# Patient Record
Sex: MALE | Race: WHITE | NOT HISPANIC OR LATINO | Employment: OTHER | URBAN - METROPOLITAN AREA
[De-identification: names, ages, dates, MRNs, and addresses within clinical notes are randomized per-mention and may not be internally consistent; named-entity substitution may affect disease eponyms.]

---

## 2023-05-20 DIAGNOSIS — I10 ESSENTIAL (PRIMARY) HYPERTENSION: ICD-10-CM

## 2023-05-22 RX ORDER — LOSARTAN POTASSIUM 100 MG/1
TABLET ORAL
Qty: 90 TABLET | Refills: 3 | Status: SHIPPED | OUTPATIENT
Start: 2023-05-22 | End: 2024-04-10 | Stop reason: SDUPTHER

## 2023-05-25 ENCOUNTER — LAB (OUTPATIENT)
Dept: LAB | Facility: LAB | Age: 83
End: 2023-05-25
Payer: MEDICARE

## 2023-05-25 ENCOUNTER — OFFICE VISIT (OUTPATIENT)
Dept: PRIMARY CARE | Facility: CLINIC | Age: 83
End: 2023-05-25
Payer: MEDICARE

## 2023-05-25 VITALS
HEIGHT: 69 IN | RESPIRATION RATE: 18 BRPM | SYSTOLIC BLOOD PRESSURE: 120 MMHG | OXYGEN SATURATION: 98 % | HEART RATE: 66 BPM | DIASTOLIC BLOOD PRESSURE: 70 MMHG | WEIGHT: 170 LBS | BODY MASS INDEX: 25.18 KG/M2

## 2023-05-25 DIAGNOSIS — I10 BENIGN ESSENTIAL HYPERTENSION: ICD-10-CM

## 2023-05-25 DIAGNOSIS — E03.9 HYPOTHYROIDISM, UNSPECIFIED TYPE: ICD-10-CM

## 2023-05-25 DIAGNOSIS — E78.00 HYPERCHOLESTEROLEMIA: ICD-10-CM

## 2023-05-25 DIAGNOSIS — E03.9 HYPOTHYROIDISM, UNSPECIFIED TYPE: Primary | ICD-10-CM

## 2023-05-25 DIAGNOSIS — I25.10 ASCVD (ARTERIOSCLEROTIC CARDIOVASCULAR DISEASE): ICD-10-CM

## 2023-05-25 DIAGNOSIS — I10 PRIMARY HYPERTENSION: ICD-10-CM

## 2023-05-25 PROBLEM — M54.16 CHRONIC LUMBAR RADICULOPATHY: Status: ACTIVE | Noted: 2023-05-25

## 2023-05-25 PROBLEM — M54.50 CHRONIC LOW BACK PAIN: Status: ACTIVE | Noted: 2023-05-25

## 2023-05-25 PROBLEM — I70.0 AORTIC CALCIFICATION (CMS-HCC): Status: ACTIVE | Noted: 2023-05-25

## 2023-05-25 PROBLEM — R04.2 HEMOPTYSIS: Status: ACTIVE | Noted: 2023-05-25

## 2023-05-25 PROBLEM — R21 RASH: Status: ACTIVE | Noted: 2023-05-25

## 2023-05-25 PROBLEM — R07.81 RIB PAIN ON RIGHT SIDE: Status: ACTIVE | Noted: 2023-05-25

## 2023-05-25 PROBLEM — G89.29 CHRONIC LOW BACK PAIN: Status: ACTIVE | Noted: 2023-05-25

## 2023-05-25 PROBLEM — L23.9 ALLERGIC DERMATITIS: Status: ACTIVE | Noted: 2023-05-25

## 2023-05-25 PROBLEM — H90.3 BILATERAL SENSORINEURAL HEARING LOSS: Status: ACTIVE | Noted: 2023-05-25

## 2023-05-25 PROBLEM — M51.26 LUMBAR DISC HERNIATION: Status: ACTIVE | Noted: 2023-05-25

## 2023-05-25 PROBLEM — I73.9 PVD (PERIPHERAL VASCULAR DISEASE) (CMS-HCC): Status: ACTIVE | Noted: 2023-05-25

## 2023-05-25 PROBLEM — M54.16 LUMBAR NEURITIS: Status: ACTIVE | Noted: 2023-05-25

## 2023-05-25 LAB
THYROTROPIN (MIU/L) IN SER/PLAS BY DETECTION LIMIT <= 0.05 MIU/L: 0.27 MIU/L (ref 0.44–3.98)
THYROXINE (T4) FREE (NG/DL) IN SER/PLAS: 1.35 NG/DL (ref 0.78–1.48)

## 2023-05-25 PROCEDURE — 3078F DIAST BP <80 MM HG: CPT | Performed by: INTERNAL MEDICINE

## 2023-05-25 PROCEDURE — 84439 ASSAY OF FREE THYROXINE: CPT

## 2023-05-25 PROCEDURE — 84443 ASSAY THYROID STIM HORMONE: CPT

## 2023-05-25 PROCEDURE — 1036F TOBACCO NON-USER: CPT | Performed by: INTERNAL MEDICINE

## 2023-05-25 PROCEDURE — 36415 COLL VENOUS BLD VENIPUNCTURE: CPT

## 2023-05-25 PROCEDURE — 99204 OFFICE O/P NEW MOD 45 MIN: CPT | Performed by: INTERNAL MEDICINE

## 2023-05-25 PROCEDURE — 3074F SYST BP LT 130 MM HG: CPT | Performed by: INTERNAL MEDICINE

## 2023-05-25 PROCEDURE — 1159F MED LIST DOCD IN RCRD: CPT | Performed by: INTERNAL MEDICINE

## 2023-05-25 RX ORDER — ASPIRIN 81 MG/1
1 TABLET ORAL DAILY
COMMUNITY

## 2023-05-25 RX ORDER — LEVOTHYROXINE SODIUM 112 UG/1
1 TABLET ORAL
COMMUNITY
Start: 2014-02-17 | End: 2023-08-29 | Stop reason: SDUPTHER

## 2023-05-25 RX ORDER — LOSARTAN POTASSIUM AND HYDROCHLOROTHIAZIDE 12.5; 1 MG/1; MG/1
1 TABLET ORAL DAILY
COMMUNITY
End: 2023-05-25 | Stop reason: SINTOL

## 2023-05-25 RX ORDER — AMLODIPINE BESYLATE 2.5 MG/1
2.5 TABLET ORAL DAILY
COMMUNITY
Start: 2023-05-13 | End: 2023-12-20 | Stop reason: WASHOUT

## 2023-05-25 RX ORDER — MULTIVITAMIN
1 TABLET ORAL DAILY
COMMUNITY

## 2023-05-25 RX ORDER — FLUOCINOLONE ACETONIDE 0.25 MG/G
OINTMENT TOPICAL
COMMUNITY
Start: 2022-09-14 | End: 2023-05-25 | Stop reason: ALTCHOICE

## 2023-05-25 RX ORDER — TRIAMCINOLONE ACETONIDE 0.25 MG/G
CREAM TOPICAL
COMMUNITY
Start: 2022-03-30 | End: 2023-05-25 | Stop reason: ALTCHOICE

## 2023-05-25 RX ORDER — NEBULIZER AND COMPRESSOR
EACH MISCELLANEOUS
Qty: 1 EACH | Refills: 0 | Status: SHIPPED | OUTPATIENT
Start: 2023-05-25

## 2023-05-25 RX ORDER — ATORVASTATIN CALCIUM 20 MG/1
20 TABLET, FILM COATED ORAL DAILY
COMMUNITY
End: 2023-08-29 | Stop reason: SDUPTHER

## 2023-05-25 RX ORDER — TAMSULOSIN HYDROCHLORIDE 0.4 MG/1
0.4 CAPSULE ORAL DAILY
COMMUNITY
Start: 2023-02-14 | End: 2023-05-25 | Stop reason: ALTCHOICE

## 2023-05-25 RX ORDER — CHLORTHALIDONE 25 MG/1
25 TABLET ORAL DAILY
COMMUNITY
Start: 2022-06-24 | End: 2023-05-25 | Stop reason: SINTOL

## 2023-05-25 ASSESSMENT — PAIN SCALES - GENERAL: PAINLEVEL: 0-NO PAIN

## 2023-06-05 PROBLEM — M51.36 LUMBAR DEGENERATIVE DISC DISEASE: Status: ACTIVE | Noted: 2023-06-05

## 2023-06-05 PROBLEM — M51.369 LUMBAR DEGENERATIVE DISC DISEASE: Status: ACTIVE | Noted: 2023-06-05

## 2023-06-05 PROBLEM — S39.012A STRAIN OF LUMBAR REGION: Status: ACTIVE | Noted: 2023-06-05

## 2023-06-05 PROBLEM — M16.11 ARTHRITIS OF RIGHT HIP: Status: ACTIVE | Noted: 2023-06-05

## 2023-06-05 PROBLEM — R00.2 PALPITATIONS: Status: ACTIVE | Noted: 2023-04-26

## 2023-06-05 PROBLEM — M16.12 ARTHRITIS OF LEFT HIP: Status: ACTIVE | Noted: 2023-06-05

## 2023-06-05 PROBLEM — M47.816 LUMBAR SPONDYLOSIS: Status: ACTIVE | Noted: 2023-06-05

## 2023-06-05 RX ORDER — MULTIVITAMIN
TABLET ORAL
COMMUNITY
End: 2023-12-20 | Stop reason: ALTCHOICE

## 2023-06-07 ENCOUNTER — OFFICE VISIT (OUTPATIENT)
Dept: PRIMARY CARE | Facility: CLINIC | Age: 83
End: 2023-06-07
Payer: MEDICARE

## 2023-06-07 DIAGNOSIS — E03.9 HYPOTHYROIDISM, UNSPECIFIED TYPE: ICD-10-CM

## 2023-06-07 DIAGNOSIS — I10 BENIGN ESSENTIAL HYPERTENSION: ICD-10-CM

## 2023-06-07 DIAGNOSIS — E78.00 HYPERCHOLESTEROLEMIA: ICD-10-CM

## 2023-06-07 DIAGNOSIS — G47.00 INSOMNIA, UNSPECIFIED TYPE: ICD-10-CM

## 2023-06-07 DIAGNOSIS — I25.10 ASCVD (ARTERIOSCLEROTIC CARDIOVASCULAR DISEASE): Primary | ICD-10-CM

## 2023-06-07 PROCEDURE — 1126F AMNT PAIN NOTED NONE PRSNT: CPT | Performed by: INTERNAL MEDICINE

## 2023-06-07 PROCEDURE — 99213 OFFICE O/P EST LOW 20 MIN: CPT | Performed by: INTERNAL MEDICINE

## 2023-06-07 PROCEDURE — 1036F TOBACCO NON-USER: CPT | Performed by: INTERNAL MEDICINE

## 2023-06-07 PROCEDURE — 1159F MED LIST DOCD IN RCRD: CPT | Performed by: INTERNAL MEDICINE

## 2023-06-07 RX ORDER — MELATONIN 3 MG
1 CAPSULE ORAL NIGHTLY
Qty: 90 CAPSULE | Refills: 1 | Status: CANCELLED | OUTPATIENT
Start: 2023-06-07 | End: 2023-12-04

## 2023-06-27 ENCOUNTER — APPOINTMENT (OUTPATIENT)
Dept: PRIMARY CARE | Facility: CLINIC | Age: 83
End: 2023-06-27
Payer: MEDICARE

## 2023-06-27 NOTE — PROGRESS NOTES
"Subjective   Modesto Velarde is a 82 y.o. male who presents for New Patient Visit.  Patient presents to [facility].  He has no acute complaints today.  Previous lab work reviewed.  Patient is due for thyroid labs.  Patient does not check his blood pressure at home.  He does not have a blood pressure cuff.        Objective     /70 (BP Location: Right arm, Patient Position: Sitting, BP Cuff Size: Adult)   Pulse 66   Resp 18   Ht 1.753 m (5' 9\")   Wt 77.1 kg (170 lb)   SpO2 98%   BMI 25.10 kg/m²      Physical Exam  Constitutional:       Comments: Elderly male   HENT:      Head: Normocephalic and atraumatic.      Nose: Nose normal.      Mouth/Throat:      Mouth: Mucous membranes are moist.      Pharynx: Oropharynx is clear.   Eyes:      Extraocular Movements: Extraocular movements intact.      Pupils: Pupils are equal, round, and reactive to light.   Cardiovascular:      Rate and Rhythm: Normal rate and regular rhythm.   Pulmonary:      Effort: No respiratory distress.      Breath sounds: Normal breath sounds. No wheezing, rhonchi or rales.   Abdominal:      General: Bowel sounds are normal. There is no distension.      Palpations: Abdomen is soft.      Tenderness: There is no abdominal tenderness. There is no guarding.   Musculoskeletal:      Right lower leg: No edema.      Left lower leg: No edema.   Skin:     General: Skin is warm and dry.   Neurological:      Mental Status: He is alert and oriented to person, place, and time. Mental status is at baseline.   Psychiatric:         Mood and Affect: Mood normal.         Behavior: Behavior normal.         Assessment/Plan   Problem List Items Addressed This Visit       ASCVD (arteriosclerotic cardiovascular disease)    Benign essential hypertension    Hypercholesterolemia    Hypothyroidism - Primary    Relevant Orders    Thyroid Stimulating Hormone (Completed)    T4, free (Completed)     Other Visit Diagnoses       Primary hypertension        Relevant " Medications    miscellaneous medical supply (Blood Pressure Cuff) misc          We will obtain thyroid labs  Follow-up in 2 weeks       Maurice Nath DO

## 2023-07-06 NOTE — PROGRESS NOTES
Subjective   Modesto Velarde is a 82 y.o. male who presents for No chief complaint on file..  Patient presents for blood work follow-up.  Labs reviewed with patient.  No acute complaints.        Objective     There were no vitals taken for this visit.     Physical Exam  Constitutional:       Comments: Elderly male   HENT:      Head: Normocephalic and atraumatic.      Nose: Nose normal.      Mouth/Throat:      Mouth: Mucous membranes are moist.      Pharynx: Oropharynx is clear.   Eyes:      Extraocular Movements: Extraocular movements intact.      Pupils: Pupils are equal, round, and reactive to light.   Cardiovascular:      Rate and Rhythm: Normal rate and regular rhythm.   Pulmonary:      Effort: No respiratory distress.      Breath sounds: Normal breath sounds. No wheezing, rhonchi or rales.   Abdominal:      General: Bowel sounds are normal. There is no distension.      Palpations: Abdomen is soft.      Tenderness: There is no abdominal tenderness. There is no guarding.   Musculoskeletal:      Right lower leg: No edema.      Left lower leg: No edema.   Skin:     General: Skin is warm and dry.   Neurological:      Mental Status: He is alert and oriented to person, place, and time. Mental status is at baseline.   Psychiatric:         Mood and Affect: Mood normal.         Behavior: Behavior normal.     Assessment/Plan   Problem List Items Addressed This Visit       ASCVD (arteriosclerotic cardiovascular disease) - Primary    Benign essential hypertension    Hypercholesterolemia    Hypothyroidism     Other Visit Diagnoses       Insomnia, unspecified type              Continue medications as previously prescribed  Follow-up in 3 months for repeat TSH and free thyroxine       Maurice Nath DO

## 2023-08-18 PROBLEM — H52.223 MYOPIA OF BOTH EYES WITH REGULAR ASTIGMATISM: Status: ACTIVE | Noted: 2023-08-18

## 2023-08-18 PROBLEM — H52.13 MYOPIA OF BOTH EYES WITH REGULAR ASTIGMATISM: Status: ACTIVE | Noted: 2023-08-18

## 2023-08-18 PROBLEM — H02.88A MEIBOMIAN GLAND DYSFUNCTION (MGD) OF UPPER AND LOWER LIDS OF BOTH EYES: Status: ACTIVE | Noted: 2023-08-18

## 2023-08-18 PROBLEM — H02.88B MEIBOMIAN GLAND DYSFUNCTION (MGD) OF UPPER AND LOWER LIDS OF BOTH EYES: Status: ACTIVE | Noted: 2023-08-18

## 2023-08-18 PROBLEM — H40.053 OCULAR HYPERTENSION, BILATERAL: Status: ACTIVE | Noted: 2023-08-18

## 2023-08-18 PROBLEM — Z96.1 BILATERAL PSEUDOPHAKIA: Status: ACTIVE | Noted: 2023-08-18

## 2023-08-18 PROBLEM — H52.03 HYPERMETROPIA OF BOTH EYES: Status: ACTIVE | Noted: 2023-08-18

## 2023-08-18 RX ORDER — CETIRIZINE HYDROCHLORIDE 10 MG/1
10 TABLET ORAL DAILY
COMMUNITY
End: 2023-12-20 | Stop reason: WASHOUT

## 2023-08-18 RX ORDER — TAMSULOSIN HYDROCHLORIDE 0.4 MG/1
0.4 CAPSULE ORAL DAILY
COMMUNITY
End: 2023-12-20 | Stop reason: WASHOUT

## 2023-08-29 DIAGNOSIS — E78.00 HYPERCHOLESTEROLEMIA: ICD-10-CM

## 2023-08-29 DIAGNOSIS — E03.9 HYPOTHYROIDISM, UNSPECIFIED TYPE: ICD-10-CM

## 2023-08-29 RX ORDER — LEVOTHYROXINE SODIUM 112 UG/1
112 TABLET ORAL
Qty: 90 TABLET | Refills: 3 | Status: SHIPPED | OUTPATIENT
Start: 2023-08-29 | End: 2023-08-30 | Stop reason: SDUPTHER

## 2023-08-29 RX ORDER — ATORVASTATIN CALCIUM 20 MG/1
20 TABLET, FILM COATED ORAL DAILY
Qty: 90 TABLET | Refills: 3 | Status: SHIPPED | OUTPATIENT
Start: 2023-08-29 | End: 2023-08-30 | Stop reason: SDUPTHER

## 2023-08-30 RX ORDER — LEVOTHYROXINE SODIUM 112 UG/1
112 TABLET ORAL
Qty: 90 TABLET | Refills: 3 | Status: SHIPPED | OUTPATIENT
Start: 2023-08-30

## 2023-08-30 RX ORDER — ATORVASTATIN CALCIUM 20 MG/1
20 TABLET, FILM COATED ORAL DAILY
Qty: 90 TABLET | Refills: 3 | Status: SHIPPED | OUTPATIENT
Start: 2023-08-30

## 2023-09-12 ENCOUNTER — OFFICE VISIT (OUTPATIENT)
Dept: PRIMARY CARE | Facility: CLINIC | Age: 83
End: 2023-09-12
Payer: MEDICARE

## 2023-09-12 VITALS
HEART RATE: 70 BPM | DIASTOLIC BLOOD PRESSURE: 80 MMHG | SYSTOLIC BLOOD PRESSURE: 154 MMHG | BODY MASS INDEX: 25.33 KG/M2 | WEIGHT: 171 LBS | RESPIRATION RATE: 14 BRPM | HEIGHT: 69 IN | OXYGEN SATURATION: 97 %

## 2023-09-12 DIAGNOSIS — I10 BENIGN ESSENTIAL HYPERTENSION: ICD-10-CM

## 2023-09-12 DIAGNOSIS — N40.1 BENIGN PROSTATIC HYPERPLASIA WITH WEAK URINARY STREAM: ICD-10-CM

## 2023-09-12 DIAGNOSIS — E03.9 HYPOTHYROIDISM, UNSPECIFIED TYPE: Primary | ICD-10-CM

## 2023-09-12 DIAGNOSIS — R79.9 ABNORMAL FINDING OF BLOOD CHEMISTRY, UNSPECIFIED: ICD-10-CM

## 2023-09-12 DIAGNOSIS — R39.12 BENIGN PROSTATIC HYPERPLASIA WITH WEAK URINARY STREAM: ICD-10-CM

## 2023-09-12 DIAGNOSIS — K42.9 UMBILICAL HERNIA WITHOUT OBSTRUCTION AND WITHOUT GANGRENE: ICD-10-CM

## 2023-09-12 PROCEDURE — 3077F SYST BP >= 140 MM HG: CPT | Performed by: INTERNAL MEDICINE

## 2023-09-12 PROCEDURE — 99214 OFFICE O/P EST MOD 30 MIN: CPT | Performed by: INTERNAL MEDICINE

## 2023-09-12 PROCEDURE — 1036F TOBACCO NON-USER: CPT | Performed by: INTERNAL MEDICINE

## 2023-09-12 PROCEDURE — 1126F AMNT PAIN NOTED NONE PRSNT: CPT | Performed by: INTERNAL MEDICINE

## 2023-09-12 PROCEDURE — 1159F MED LIST DOCD IN RCRD: CPT | Performed by: INTERNAL MEDICINE

## 2023-09-12 PROCEDURE — 3079F DIAST BP 80-89 MM HG: CPT | Performed by: INTERNAL MEDICINE

## 2023-09-12 RX ORDER — METOPROLOL SUCCINATE 25 MG/1
25 TABLET, EXTENDED RELEASE ORAL DAILY
COMMUNITY

## 2023-09-12 ASSESSMENT — PAIN SCALES - GENERAL: PAINLEVEL: 0-NO PAIN

## 2023-09-13 ENCOUNTER — LAB (OUTPATIENT)
Dept: LAB | Facility: LAB | Age: 83
End: 2023-09-13
Payer: MEDICARE

## 2023-09-13 DIAGNOSIS — I10 BENIGN ESSENTIAL HYPERTENSION: ICD-10-CM

## 2023-09-13 DIAGNOSIS — R39.12 BENIGN PROSTATIC HYPERPLASIA WITH WEAK URINARY STREAM: ICD-10-CM

## 2023-09-13 DIAGNOSIS — R79.9 ABNORMAL FINDING OF BLOOD CHEMISTRY, UNSPECIFIED: ICD-10-CM

## 2023-09-13 DIAGNOSIS — E03.9 HYPOTHYROIDISM, UNSPECIFIED TYPE: ICD-10-CM

## 2023-09-13 DIAGNOSIS — N40.1 BENIGN PROSTATIC HYPERPLASIA WITH WEAK URINARY STREAM: ICD-10-CM

## 2023-09-13 DIAGNOSIS — R10.9 ABDOMINAL PAIN, UNSPECIFIED ABDOMINAL LOCATION: ICD-10-CM

## 2023-09-13 LAB
ALANINE AMINOTRANSFERASE (SGPT) (U/L) IN SER/PLAS: 16 U/L (ref 10–52)
ALBUMIN (G/DL) IN SER/PLAS: 4.1 G/DL (ref 3.4–5)
ALKALINE PHOSPHATASE (U/L) IN SER/PLAS: 54 U/L (ref 33–136)
ANION GAP IN SER/PLAS: 11 MMOL/L (ref 10–20)
ASPARTATE AMINOTRANSFERASE (SGOT) (U/L) IN SER/PLAS: 18 U/L (ref 9–39)
BILIRUBIN TOTAL (MG/DL) IN SER/PLAS: 0.5 MG/DL (ref 0–1.2)
CALCIUM (MG/DL) IN SER/PLAS: 8.9 MG/DL (ref 8.6–10.3)
CARBON DIOXIDE, TOTAL (MMOL/L) IN SER/PLAS: 27 MMOL/L (ref 21–32)
CHLORIDE (MMOL/L) IN SER/PLAS: 109 MMOL/L (ref 98–107)
CHOLESTEROL (MG/DL) IN SER/PLAS: 124 MG/DL (ref 0–199)
CHOLESTEROL IN HDL (MG/DL) IN SER/PLAS: 56.1 MG/DL
CHOLESTEROL/HDL RATIO: 2.2
CREATININE (MG/DL) IN SER/PLAS: 1.04 MG/DL (ref 0.5–1.3)
ERYTHROCYTE DISTRIBUTION WIDTH (RATIO) BY AUTOMATED COUNT: 13.2 % (ref 11.5–14.5)
ERYTHROCYTE MEAN CORPUSCULAR HEMOGLOBIN CONCENTRATION (G/DL) BY AUTOMATED: 32.1 G/DL (ref 32–36)
ERYTHROCYTE MEAN CORPUSCULAR VOLUME (FL) BY AUTOMATED COUNT: 100 FL (ref 80–100)
ERYTHROCYTES (10*6/UL) IN BLOOD BY AUTOMATED COUNT: 4.2 X10E12/L (ref 4.5–5.9)
ESTIMATED AVERAGE GLUCOSE FOR HBA1C: 105 MG/DL
GFR MALE: 71 ML/MIN/1.73M2
GLUCOSE (MG/DL) IN SER/PLAS: 86 MG/DL (ref 74–99)
HEMATOCRIT (%) IN BLOOD BY AUTOMATED COUNT: 42 % (ref 41–52)
HEMOGLOBIN (G/DL) IN BLOOD: 13.5 G/DL (ref 13.5–17.5)
HEMOGLOBIN A1C/HEMOGLOBIN TOTAL IN BLOOD: 5.3 %
LDL: 59 MG/DL (ref 0–99)
LEUKOCYTES (10*3/UL) IN BLOOD BY AUTOMATED COUNT: 6.5 X10E9/L (ref 4.4–11.3)
NRBC (PER 100 WBCS) BY AUTOMATED COUNT: 0 /100 WBC (ref 0–0)
PLATELETS (10*3/UL) IN BLOOD AUTOMATED COUNT: 283 X10E9/L (ref 150–450)
POTASSIUM (MMOL/L) IN SER/PLAS: 4.6 MMOL/L (ref 3.5–5.3)
PROSTATE SPECIFIC AG (NG/ML) IN SER/PLAS: 2.51 NG/ML (ref 0–4)
PROTEIN TOTAL: 6 G/DL (ref 6.4–8.2)
SODIUM (MMOL/L) IN SER/PLAS: 142 MMOL/L (ref 136–145)
THYROTROPIN (MIU/L) IN SER/PLAS BY DETECTION LIMIT <= 0.05 MIU/L: 2.03 MIU/L (ref 0.44–3.98)
THYROXINE (T4) FREE (NG/DL) IN SER/PLAS: 1.24 NG/DL (ref 0.61–1.12)
TRIGLYCERIDE (MG/DL) IN SER/PLAS: 45 MG/DL (ref 0–149)
UREA NITROGEN (MG/DL) IN SER/PLAS: 16 MG/DL (ref 6–23)
VLDL: 9 MG/DL (ref 0–40)

## 2023-09-13 PROCEDURE — 84439 ASSAY OF FREE THYROXINE: CPT

## 2023-09-13 PROCEDURE — 80053 COMPREHEN METABOLIC PANEL: CPT

## 2023-09-13 PROCEDURE — 84443 ASSAY THYROID STIM HORMONE: CPT

## 2023-09-13 PROCEDURE — 36415 COLL VENOUS BLD VENIPUNCTURE: CPT

## 2023-09-13 PROCEDURE — 85027 COMPLETE CBC AUTOMATED: CPT

## 2023-09-13 PROCEDURE — 83036 HEMOGLOBIN GLYCOSYLATED A1C: CPT

## 2023-09-13 PROCEDURE — 84153 ASSAY OF PSA TOTAL: CPT

## 2023-09-13 PROCEDURE — 82652 VIT D 1 25-DIHYDROXY: CPT

## 2023-09-13 PROCEDURE — 80061 LIPID PANEL: CPT

## 2023-09-16 LAB — VITAMIN D 1,25-DIHYDROXY: 37.5 PG/ML (ref 19.9–79.3)

## 2023-09-18 ENCOUNTER — TELEPHONE (OUTPATIENT)
Dept: PRIMARY CARE | Facility: CLINIC | Age: 83
End: 2023-09-18
Payer: MEDICARE

## 2023-09-18 DIAGNOSIS — R10.9 ABDOMINAL PAIN: Primary | ICD-10-CM

## 2023-09-29 NOTE — PROGRESS NOTES
"Subjective   Modesto Velarde is a 83 y.o. male who presents for Follow-up.  Patient presents for concerns of umbilical hernia.  No pain.  No nausea or vomiting.  Patient saw urology who diagnosed him with BPH and is checking PSA.  Repeat blood pressure 140/70        Objective     /80 (BP Location: Left arm, Patient Position: Sitting, BP Cuff Size: Adult)   Pulse 70   Resp 14   Ht 1.753 m (5' 9\")   Wt 77.6 kg (171 lb)   SpO2 97%   BMI 25.25 kg/m²      Physical Exam  Constitutional:       Appearance: Normal appearance.      Comments: Elderly male   HENT:      Head: Normocephalic and atraumatic.      Nose: Nose normal.      Mouth/Throat:      Mouth: Mucous membranes are moist.      Pharynx: Oropharynx is clear.   Eyes:      Extraocular Movements: Extraocular movements intact.      Pupils: Pupils are equal, round, and reactive to light.   Cardiovascular:      Rate and Rhythm: Normal rate and regular rhythm.   Pulmonary:      Effort: No respiratory distress.      Breath sounds: Normal breath sounds. No wheezing, rhonchi or rales.   Abdominal:      General: Bowel sounds are normal. There is no distension.      Palpations: Abdomen is soft.      Tenderness: There is no abdominal tenderness. There is no guarding.      Comments: Umbilical hernia, nontender   Musculoskeletal:      Right lower leg: No edema.      Left lower leg: No edema.   Skin:     General: Skin is warm and dry.   Neurological:      Mental Status: He is alert and oriented to person, place, and time. Mental status is at baseline.   Psychiatric:         Mood and Affect: Mood normal.         Behavior: Behavior normal.         Assessment/Plan   Problem List Items Addressed This Visit       Benign essential hypertension    Relevant Orders    CBC (Completed)    Comprehensive Metabolic Panel (Completed)    Hemoglobin A1C (Completed)    Lipid Panel (Completed)    Vitamin D 1,25 Dihydroxy (for eval of hypercalcemia) (Completed)    Hypothyroidism - " Primary    Relevant Orders    Thyroxine, Free (Completed)    Thyroid Stimulating Hormone (Completed)     Other Visit Diagnoses       Umbilical hernia without obstruction and without gangrene        Relevant Orders    Abdominal Ultrasound    Referral to General Surgery    Benign prostatic hyperplasia with weak urinary stream        Relevant Orders    Referral to Urology    PSA (Completed)    Abnormal finding of blood chemistry, unspecified        Relevant Orders    Hemoglobin A1C (Completed)          We will check an ultrasound and refer to general surgery for umbilical hernia  Maintain follow-up with urology, follow-up PSA  Continue medications as previously prescribed  Return in 3 months       Maurice Nath DO

## 2023-10-25 ENCOUNTER — APPOINTMENT (OUTPATIENT)
Dept: PAIN MEDICINE | Facility: CLINIC | Age: 83
End: 2023-10-25
Payer: MEDICARE

## 2023-12-20 ENCOUNTER — OFFICE VISIT (OUTPATIENT)
Dept: PRIMARY CARE | Facility: CLINIC | Age: 83
End: 2023-12-20
Payer: MEDICARE

## 2023-12-20 VITALS
HEIGHT: 69 IN | BODY MASS INDEX: 25.48 KG/M2 | RESPIRATION RATE: 16 BRPM | OXYGEN SATURATION: 99 % | SYSTOLIC BLOOD PRESSURE: 132 MMHG | DIASTOLIC BLOOD PRESSURE: 74 MMHG | WEIGHT: 172 LBS | HEART RATE: 60 BPM

## 2023-12-20 DIAGNOSIS — R39.11 BENIGN PROSTATIC HYPERPLASIA WITH URINARY HESITANCY: Primary | ICD-10-CM

## 2023-12-20 DIAGNOSIS — N40.1 BENIGN PROSTATIC HYPERPLASIA WITH URINARY HESITANCY: Primary | ICD-10-CM

## 2023-12-20 DIAGNOSIS — E03.9 HYPOTHYROIDISM, UNSPECIFIED TYPE: ICD-10-CM

## 2023-12-20 PROBLEM — H02.88A MEIBOMIAN GLAND DYSFUNCTION (MGD) OF UPPER AND LOWER EYELID OF RIGHT EYE: Status: ACTIVE | Noted: 2023-12-20

## 2023-12-20 PROBLEM — R39.9 LOWER URINARY TRACT SYMPTOMS (LUTS): Status: ACTIVE | Noted: 2023-12-20

## 2023-12-20 PROBLEM — K42.9 UMBILICAL HERNIA: Status: ACTIVE | Noted: 2023-12-20

## 2023-12-20 PROBLEM — H02.88B MEIBOMIAN GLAND DYSFUNCTION (MGD) OF UPPER AND LOWER EYELID OF LEFT EYE: Status: ACTIVE | Noted: 2023-12-20

## 2023-12-20 PROCEDURE — 1036F TOBACCO NON-USER: CPT | Performed by: INTERNAL MEDICINE

## 2023-12-20 PROCEDURE — 99214 OFFICE O/P EST MOD 30 MIN: CPT | Performed by: INTERNAL MEDICINE

## 2023-12-20 PROCEDURE — 1159F MED LIST DOCD IN RCRD: CPT | Performed by: INTERNAL MEDICINE

## 2023-12-20 PROCEDURE — 3075F SYST BP GE 130 - 139MM HG: CPT | Performed by: INTERNAL MEDICINE

## 2023-12-20 PROCEDURE — 3078F DIAST BP <80 MM HG: CPT | Performed by: INTERNAL MEDICINE

## 2023-12-20 PROCEDURE — 1126F AMNT PAIN NOTED NONE PRSNT: CPT | Performed by: INTERNAL MEDICINE

## 2023-12-20 ASSESSMENT — PAIN SCALES - GENERAL: PAINLEVEL: 0-NO PAIN

## 2023-12-28 ENCOUNTER — APPOINTMENT (OUTPATIENT)
Dept: RADIOLOGY | Facility: HOSPITAL | Age: 83
End: 2023-12-28
Payer: MEDICARE

## 2023-12-28 ENCOUNTER — HOSPITAL ENCOUNTER (EMERGENCY)
Facility: HOSPITAL | Age: 83
Discharge: HOME | End: 2023-12-28
Payer: MEDICARE

## 2023-12-28 VITALS
TEMPERATURE: 97.7 F | OXYGEN SATURATION: 97 % | WEIGHT: 171.96 LBS | SYSTOLIC BLOOD PRESSURE: 157 MMHG | HEART RATE: 55 BPM | DIASTOLIC BLOOD PRESSURE: 70 MMHG | RESPIRATION RATE: 20 BRPM | BODY MASS INDEX: 25.39 KG/M2

## 2023-12-28 DIAGNOSIS — M79.642 HAND PAIN, LEFT: Primary | ICD-10-CM

## 2023-12-28 PROCEDURE — 73130 X-RAY EXAM OF HAND: CPT | Mod: LT

## 2023-12-28 PROCEDURE — 99283 EMERGENCY DEPT VISIT LOW MDM: CPT

## 2023-12-28 PROCEDURE — 73130 X-RAY EXAM OF HAND: CPT | Mod: LEFT SIDE | Performed by: RADIOLOGY

## 2023-12-28 ASSESSMENT — COLUMBIA-SUICIDE SEVERITY RATING SCALE - C-SSRS
1. IN THE PAST MONTH, HAVE YOU WISHED YOU WERE DEAD OR WISHED YOU COULD GO TO SLEEP AND NOT WAKE UP?: NO
2. HAVE YOU ACTUALLY HAD ANY THOUGHTS OF KILLING YOURSELF?: NO
6. HAVE YOU EVER DONE ANYTHING, STARTED TO DO ANYTHING, OR PREPARED TO DO ANYTHING TO END YOUR LIFE?: NO

## 2023-12-28 NOTE — ED PROVIDER NOTES
HPI   Chief Complaint   Patient presents with    Hand Pain     Patient states left hand pain, hit hand on table 2wks ago and still painful       Patient is a healthy nontoxic-appearing 83-year-old male with past medical history of allergic dermatitis, aortic calcification, arteriosclerotic cardiovascular disease, hypertension, hemoptysis, hypercholesterolemia, PVD, palpitations, presents emergency today for complaint of left hand pain.  Patient states about 2 weeks ago he accidentally struck his hand against a corner of a table.  Patient states he had some pain at the time however resolved.  Patient states however at passable days pain is returned to the top of the left hand.  Patient states he has pain more to the base of the pinky finger into the wrist.  Patient denies any wrist pain or injury.  Patient complains of worsening pain with range of motion and movement.  Patient is right-hand dominant.  Patient denies any numbness or tingling.  Patient denies any weakness in the hands or arm.  Patient denies any chest pain, shortness of breath difficulty breathing, abdominal pain, nausea or vomiting, fever, shaking, or chills.                          Vandiver Coma Scale Score: 15                  Patient History   Past Medical History:   Diagnosis Date    Abrasion of unspecified ear, initial encounter     Abrasion of ear canal    Atherosclerosis of aorta (CMS/HCC) 08/03/2022    Aortic calcification    Atherosclerotic heart disease of native coronary artery without angina pectoris 08/03/2022    ASCVD (arteriosclerotic cardiovascular disease)    Benign prostatic hyperplasia without lower urinary tract symptoms     BPH (benign prostatic hyperplasia)    Encounter for screening for malignant neoplasm of colon     Screening for colorectal cancer    Encounter for screening for malignant neoplasm of prostate     Prostate cancer screening    Hemoptysis 02/22/2022    Hemoptysis    Other chronic pancreatitis (CMS/HCC)     Other  chronic pancreatitis    Peripheral vascular disease, unspecified (CMS/HCC) 08/03/2022    PVD (peripheral vascular disease)    Personal history of diseases of the skin and subcutaneous tissue     History of dermatitis    Personal history of other diseases of the circulatory system     History of coronary atherosclerosis    Personal history of other diseases of the circulatory system 11/18/2014    History of hypertension    Personal history of other diseases of the nervous system and sense organs 11/08/2016    History of hearing loss    Personal history of other diseases of the respiratory system     History of upper respiratory infection    Personal history of other endocrine, nutritional and metabolic disease     History of hypothyroidism    Personal history of other endocrine, nutritional and metabolic disease 11/18/2014    History of hypercholesterolemia    Personal history of other infectious and parasitic diseases     History of shingles    Personal history of other specified conditions     History of abdominal pain    Pleurodynia 03/30/2022    Rib pain on right side    Radiculopathy, lumbar region 04/28/2022    Chronic lumbar radiculopathy    Rash and other nonspecific skin eruption 03/30/2022    Rash    Strain of muscle, fascia and tendon of lower back, initial encounter     Lumbosacral strain    Strain of other muscle(s) and tendon(s) of posterior muscle group at lower leg level, unspecified leg, initial encounter     Gastrocnemius muscle strain     Past Surgical History:   Procedure Laterality Date    CATARACT EXTRACTION  11/18/2014    Cataract Surgery    HERNIA REPAIR  11/18/2014    Hernia Repair    OTHER SURGICAL HISTORY  09/21/2021    Excision of squamous cell carcinoma     Family History   Problem Relation Name Age of Onset    Other (cardiac disorder) Mother      Other (cardiac disorder) Father      Stroke Other       Social History     Tobacco Use    Smoking status: Never    Smokeless tobacco: Never    Substance Use Topics    Alcohol use: Never    Drug use: Never       Physical Exam   ED Triage Vitals [12/28/23 0950]   Temp Heart Rate Resp BP   36.5 °C (97.7 °F) 59 18 (!) 186/77      SpO2 Temp src Heart Rate Source Patient Position   97 % -- Monitor Sitting      BP Location FiO2 (%)     Right arm --       Physical Exam  Vitals and nursing note reviewed.   Constitutional:       General: He is not in acute distress.     Appearance: Normal appearance. He is not ill-appearing, toxic-appearing or diaphoretic.   HENT:      Head: Normocephalic.   Cardiovascular:      Rate and Rhythm: Normal rate and regular rhythm.      Pulses: Normal pulses.      Heart sounds: Normal heart sounds. No murmur heard.     No friction rub. No gallop.   Pulmonary:      Effort: Pulmonary effort is normal. No respiratory distress.      Breath sounds: Normal breath sounds. No stridor. No wheezing, rhonchi or rales.   Chest:      Chest wall: No tenderness.   Musculoskeletal:         General: Tenderness and signs of injury present. No swelling or deformity. Normal range of motion.      Cervical back: Normal range of motion and neck supple.      Right lower leg: No edema.      Left lower leg: No edema.      Comments: Mild reproducible tenderness upon palpation to the dorsal surface of the left lateral hand with no ecchymosis, erythema or gross deformity present   Skin:     General: Skin is warm.      Capillary Refill: Capillary refill takes less than 2 seconds.      Coloration: Skin is not jaundiced or pale.      Findings: No bruising, erythema, lesion or rash.   Neurological:      General: No focal deficit present.      Mental Status: He is alert and oriented to person, place, and time.         ED Course & MDM   Diagnoses as of 12/28/23 1926   Hand pain, left       Medical Decision Making  Given patient's pain presentation a thorough exam was performed.  Patient does have mild reproducible tenderness upon palpation of the dorsal surface of the  left lateral hand with no ecchymosis, erythema or gross deformity present, hand grasp strong regular bilaterally, cap refills less than 3 seconds, able to flex extend all digits without any difficulty, radial pulses strong and regular, no anatomical snuffbox tenderness upon palpation, I have a low suspicion for flexor extensor tendon injury, vascular compromise.  X-ray was ordered of the left hand.  X-ray interpreted by radiologist reveals no acute osseous abnormality, fracture or dislocation.  Patient received Velcro wrist splint in the emergency room and encouraged use of Tylenol for discomfort.  I encouraged monitoring symptoms, if they become worse return to the emergency room for further evaluation, otherwise follow-up with primary care provider as needed.  Patient was agreeable with this plan and discharged home in stable condition.      MAICOL Germain-CNP     Portions of this note were generated using digital voice recognition software, and may contain grammatical errors     MAICOL Germain-CNP  12/28/23 1927

## 2023-12-29 NOTE — PROGRESS NOTES
"Subjective   Modesto Velarde is a 83 y.o. male who presents for Follow-up (Bloodwork and testing ).  Patient presents for blood work follow-up.  Lab work reviewed with patient.  He is taking his Synthroid 6 days a week.  Thyroid labs acceptable.  Patient saw Dr. Arnold who started metoprolol.  No acute issues or complaints.        Objective     /74 (BP Location: Right arm, Patient Position: Sitting, BP Cuff Size: Adult)   Pulse 60   Resp 16   Ht 1.753 m (5' 9\")   Wt 78 kg (172 lb)   SpO2 99%   BMI 25.40 kg/m²      Physical Exam  Constitutional:       Appearance: Normal appearance.      Comments: Elderly male   HENT:      Head: Normocephalic and atraumatic.      Nose: Nose normal.      Mouth/Throat:      Mouth: Mucous membranes are moist.      Pharynx: Oropharynx is clear.   Eyes:      Extraocular Movements: Extraocular movements intact.      Pupils: Pupils are equal, round, and reactive to light.   Cardiovascular:      Rate and Rhythm: Normal rate and regular rhythm.   Pulmonary:      Effort: No respiratory distress.      Breath sounds: Normal breath sounds. No wheezing, rhonchi or rales.   Abdominal:      General: Bowel sounds are normal. There is no distension.      Palpations: Abdomen is soft.      Tenderness: There is no abdominal tenderness. There is no guarding.      Comments: Umbilical hernia, nontender   Musculoskeletal:      Right lower leg: No edema.      Left lower leg: No edema.   Skin:     General: Skin is warm and dry.   Neurological:      Mental Status: He is alert and oriented to person, place, and time. Mental status is at baseline.   Psychiatric:         Mood and Affect: Mood normal.         Behavior: Behavior normal.     Assessment/Plan   Problem List Items Addressed This Visit       Hypothyroidism    Relevant Orders    Thyroxine, Free    Thyroid Stimulating Hormone     Other Visit Diagnoses       Benign prostatic hyperplasia with urinary hesitancy    -  Primary    Relevant Orders    " Referral to Urology          Medications as previously prescribed  Return in 4 months for repeat TSH and free thyroxine       Maurice Nath DO

## 2024-02-13 NOTE — PROGRESS NOTES
"Subjective     Modesto Velarde is a 83 y.o. male with BPH and LUTS who presents as a new patient kindly referred by his PCP, Dr. Nath.     His PVR today is 200cc. He was previously on Flomax but is no longer on this medication. He did not see improvements on this medication. He previously saw [facility] urology in 2018.     He is s/p \"rotor rooter\" at [facility] in 2018.     His most bothersome urinary symptom is urinary urgency and urinary frequency. In the mornings, he urinates q 30 minutes with moderate volumes. His PSA is normal at 2.51. He states he drinks 2 cups of coffee daily and has difficulty cutting back on this. He does not drink alcohol.     PMHx: CVD, HTN, LUTS,     PSHx:  Cataract extraction (11/18/2014); Hernia repair (11/18/2014); HoLEP    Social: Never smoker    Family: Sisters- breast cancer            Review of Systems   All other systems reviewed and are negative.      Objective   Physical Exam  Gen: No acute distress     Psych: Alert and oriented x3     Neuro:  Normal ROM    Resp: Nonlabored respirations     CV: Regular rate and rhythm     Abd: S, NT, ND.   : Deferred    Skin: Warm, dry and intact without rashes     Lymphatics: No peripheral edema       Assessment/Plan   Problem List Items Addressed This Visit             ICD-10-CM    Lower urinary tract symptoms (LUTS) - Primary R39.9     We had a discussion regarding contributing factors to the patient's lower urinary tract symptoms. I educated the patient on dietary and behavioral modifications pertinent to the patient's complaints. I recommended to avoid caffeine, carbonation, citrus, alcohol, spicy and acidic oral intake and to regulate fluid intake and voiding (timed voiding, double voiding). I stressed the importance of avoiding constipation and recommended stool softeners unless diarrhea present. We discussed limiting fluid intake at night and elevating legs prior to bedtime.     He states his urinary symptoms are bothersome and affect his " quality of life. We discussed conducting a sleep study with his PCP to investigate his nocturia related to possible SUJATHA.     We discussed bladder relaxant medications which has risk of causing confusion in patient's who are older. There is also risk of urinary retention which is a risk given he already retains urine. I would recommend conservative measures prior to prescribing medication. If his symptoms do not improve after behavior modifications, I would refer him to one of my partners who specializes in men's urinary issues as they age.           Other Visit Diagnoses         Codes    Benign prostatic hyperplasia with urinary hesitancy     N40.1, R39.11    Relevant Orders    Post-Void Residual (Completed)          Thank you for the kind referral and allowing me to take part in the care of this patient.        Plan:  Discontinue PSA monitoring given his age and value  List of bladder irritants, try to limit  Recommend sleep study to be done with PCP for nocturia 2-3x may be related to SUJATHA  I think his biggest issue is urgency and frequency.  I offered a referral to one of my colleagues who specialized in LUTS.  The medication class he may benefit most from is a bladder relaxant for his q30 min freq with urgency.   However in 80 years olds that can cause some issues with cognitive functioning.  Also, he retains over 200 ml and can cause issues with retention of urine.  The beta agonists may help rather than an anticholinergic but those medications are quite costly.  He refused referral to anywhere outside of [place] to partners of mine who may be able to offer him more therapies than I currently can.  FUV with me PRN    Will give info on urologists that I would refer him to if interested.            Dorise Attestation  By signing my name below, I, Katelyn Casalla, Scribe   attest that this documentation has been prepared under the direction and in the presence of Morris Calderon MD MPH.

## 2024-02-16 ENCOUNTER — OFFICE VISIT (OUTPATIENT)
Dept: UROLOGY | Facility: CLINIC | Age: 84
End: 2024-02-16
Payer: MEDICARE

## 2024-02-16 VITALS
SYSTOLIC BLOOD PRESSURE: 202 MMHG | DIASTOLIC BLOOD PRESSURE: 81 MMHG | HEIGHT: 69 IN | HEART RATE: 55 BPM | BODY MASS INDEX: 25.95 KG/M2 | WEIGHT: 175.2 LBS

## 2024-02-16 DIAGNOSIS — R39.11 BENIGN PROSTATIC HYPERPLASIA WITH URINARY HESITANCY: ICD-10-CM

## 2024-02-16 DIAGNOSIS — R39.9 LOWER URINARY TRACT SYMPTOMS (LUTS): Primary | ICD-10-CM

## 2024-02-16 DIAGNOSIS — N40.1 BENIGN PROSTATIC HYPERPLASIA WITH URINARY HESITANCY: ICD-10-CM

## 2024-02-16 PROCEDURE — 3077F SYST BP >= 140 MM HG: CPT | Performed by: STUDENT IN AN ORGANIZED HEALTH CARE EDUCATION/TRAINING PROGRAM

## 2024-02-16 PROCEDURE — 3079F DIAST BP 80-89 MM HG: CPT | Performed by: STUDENT IN AN ORGANIZED HEALTH CARE EDUCATION/TRAINING PROGRAM

## 2024-02-16 PROCEDURE — 99204 OFFICE O/P NEW MOD 45 MIN: CPT | Performed by: STUDENT IN AN ORGANIZED HEALTH CARE EDUCATION/TRAINING PROGRAM

## 2024-02-16 PROCEDURE — 1159F MED LIST DOCD IN RCRD: CPT | Performed by: STUDENT IN AN ORGANIZED HEALTH CARE EDUCATION/TRAINING PROGRAM

## 2024-02-16 PROCEDURE — 1126F AMNT PAIN NOTED NONE PRSNT: CPT | Performed by: STUDENT IN AN ORGANIZED HEALTH CARE EDUCATION/TRAINING PROGRAM

## 2024-02-16 PROCEDURE — 1036F TOBACCO NON-USER: CPT | Performed by: STUDENT IN AN ORGANIZED HEALTH CARE EDUCATION/TRAINING PROGRAM

## 2024-02-16 NOTE — LETTER
"February 16, 2024     Maurice Nath DO  [address]  [facility] [place], [place] [address]   [id number]    Patient: Modesto Velarde   YOB: 1940   Date of Visit: 2/16/2024       Dear Dr. Maurice Nath, :    Thank you for referring Modesto Velarde to me for evaluation. Below are my notes for this consultation.  If you have questions, please do not hesitate to call me. I look forward to following your patient along with you.       Sincerely,     Morris Calderon MD MPH      CC: No Recipients  ______________________________________________________________________________________    Subjective     Modesto Velarde is a 83 y.o. male with BPH and LUTS who presents as a new patient kindly referred by his PCP, Dr. Nath.     His PVR today is 200cc. He was previously on Flomax but is no longer on this medication. He did not see improvements on this medication. He previously saw [facility] urology in 2018.     He is s/p \"rotor rooter\" at [facility] in 2018.     His most bothersome urinary symptom is urinary urgency and urinary frequency. In the mornings, he urinates q 30 minutes with moderate volumes. His PSA is normal at 2.51. He states he drinks 2 cups of coffee daily and has difficulty cutting back on this. He does not drink alcohol.     PMHx: CVD, HTN, LUTS,     PSHx:  Cataract extraction (11/18/2014); Hernia repair (11/18/2014); HoLEP    Social: Never smoker    Family: Sisters- breast cancer            Review of Systems   All other systems reviewed and are negative.      Objective   Physical Exam  Gen: No acute distress     Psych: Alert and oriented x3     Neuro:  Normal ROM    Resp: Nonlabored respirations     CV: Regular rate and rhythm     Abd: S, NT, ND.   : Deferred    Skin: Warm, dry and intact without rashes     Lymphatics: No peripheral edema       Assessment/Plan   Problem List Items Addressed This Visit             ICD-10-CM    Lower urinary tract symptoms (LUTS) - Primary R39.9     " We had a discussion regarding contributing factors to the patient's lower urinary tract symptoms. I educated the patient on dietary and behavioral modifications pertinent to the patient's complaints. I recommended to avoid caffeine, carbonation, citrus, alcohol, spicy and acidic oral intake and to regulate fluid intake and voiding (timed voiding, double voiding). I stressed the importance of avoiding constipation and recommended stool softeners unless diarrhea present. We discussed limiting fluid intake at night and elevating legs prior to bedtime.     He states his urinary symptoms are bothersome and affect his quality of life. We discussed conducting a sleep study with his PCP to investigate his nocturia related to possible SUJATHA.     We discussed bladder relaxant medications which has risk of causing confusion in patient's who are older. There is also risk of urinary retention which is a risk given he already retains urine. I would recommend conservative measures prior to prescribing medication. If his symptoms do not improve after behavior modifications, I would refer him to one of my partners who specializes in men's urinary issues as they age.           Other Visit Diagnoses         Codes    Benign prostatic hyperplasia with urinary hesitancy     N40.1, R39.11    Relevant Orders    Post-Void Residual (Completed)          Thank you for the kind referral and allowing me to take part in the care of this patient.        Plan:  Discontinue PSA monitoring given his age and value  List of bladder irritants, try to limit  Recommend sleep study to be done with PCP for nocturia 2-3x may be related to SUJATHA  I think his biggest issue is urgency and frequency.  I offered a referral to one of my colleagues who specialized in LUTS.  The medication class he may benefit most from is a bladder relaxant for his q30 min freq with urgency.   However in 80 years olds that can cause some issues with cognitive functioning.  Also, he  retains over 200 ml and can cause issues with retention of urine.  The beta agonists may help rather than an anticholinergic but those medications are quite costly.  He refused referral to anywhere outside of [place] to partners of mine who may be able to offer him more therapies than I currently can.  FUV with me PRN    Will give info on urologists that I would refer him to if interested.            Kendrick Attestation  By signing my name below, I, Katelyn Casalla, Scribe   attest that this documentation has been prepared under the direction and in the presence of Morris Calderon MD MPH.

## 2024-02-16 NOTE — ASSESSMENT & PLAN NOTE
We had a discussion regarding contributing factors to the patient's lower urinary tract symptoms. I educated the patient on dietary and behavioral modifications pertinent to the patient's complaints. I recommended to avoid caffeine, carbonation, citrus, alcohol, spicy and acidic oral intake and to regulate fluid intake and voiding (timed voiding, double voiding). I stressed the importance of avoiding constipation and recommended stool softeners unless diarrhea present. We discussed limiting fluid intake at night and elevating legs prior to bedtime.     He states his urinary symptoms are bothersome and affect his quality of life. We discussed conducting a sleep study with his PCP to investigate his nocturia related to possible SUJATHA.     We discussed bladder relaxant medications which has risk of causing confusion in patient's who are older. There is also risk of urinary retention which is a risk given he already retains urine. I would recommend conservative measures prior to prescribing medication. If his symptoms do not improve after behavior modifications, I would refer him to one of my partners who specializes in men's urinary issues as they age.

## 2024-04-02 ENCOUNTER — LAB (OUTPATIENT)
Dept: LAB | Facility: LAB | Age: 84
End: 2024-04-02
Payer: MEDICARE

## 2024-04-02 DIAGNOSIS — E03.9 HYPOTHYROIDISM, UNSPECIFIED TYPE: ICD-10-CM

## 2024-04-02 LAB
T4 FREE SERPL-MCNC: 0.83 NG/DL (ref 0.61–1.12)
TSH SERPL-ACNC: 12.26 MIU/L (ref 0.44–3.98)

## 2024-04-02 PROCEDURE — 36415 COLL VENOUS BLD VENIPUNCTURE: CPT

## 2024-04-02 PROCEDURE — 84443 ASSAY THYROID STIM HORMONE: CPT

## 2024-04-02 PROCEDURE — 84439 ASSAY OF FREE THYROXINE: CPT

## 2024-04-07 ENCOUNTER — APPOINTMENT (OUTPATIENT)
Dept: RADIOLOGY | Facility: HOSPITAL | Age: 84
End: 2024-04-07
Payer: MEDICARE

## 2024-04-07 ENCOUNTER — HOSPITAL ENCOUNTER (EMERGENCY)
Facility: HOSPITAL | Age: 84
Discharge: HOME | End: 2024-04-07
Attending: STUDENT IN AN ORGANIZED HEALTH CARE EDUCATION/TRAINING PROGRAM
Payer: MEDICARE

## 2024-04-07 ENCOUNTER — APPOINTMENT (OUTPATIENT)
Dept: CARDIOLOGY | Facility: HOSPITAL | Age: 84
End: 2024-04-07
Payer: MEDICARE

## 2024-04-07 VITALS
OXYGEN SATURATION: 100 % | WEIGHT: 170 LBS | HEART RATE: 56 BPM | HEIGHT: 69 IN | SYSTOLIC BLOOD PRESSURE: 166 MMHG | TEMPERATURE: 97.7 F | BODY MASS INDEX: 25.18 KG/M2 | DIASTOLIC BLOOD PRESSURE: 78 MMHG | RESPIRATION RATE: 20 BRPM

## 2024-04-07 DIAGNOSIS — I15.9 SECONDARY HYPERTENSION: ICD-10-CM

## 2024-04-07 DIAGNOSIS — R42 DIZZINESS: Primary | ICD-10-CM

## 2024-04-07 LAB
ALBUMIN SERPL BCP-MCNC: 4 G/DL (ref 3.4–5)
ALP SERPL-CCNC: 49 U/L (ref 33–136)
ALT SERPL W P-5'-P-CCNC: 19 U/L (ref 10–52)
ANION GAP SERPL CALC-SCNC: 10 MMOL/L (ref 10–20)
AST SERPL W P-5'-P-CCNC: 29 U/L (ref 9–39)
BASOPHILS # BLD AUTO: 0.07 X10*3/UL (ref 0–0.1)
BASOPHILS NFR BLD AUTO: 1 %
BILIRUB SERPL-MCNC: 0.4 MG/DL (ref 0–1.2)
BNP SERPL-MCNC: 95 PG/ML (ref 0–99)
BUN SERPL-MCNC: 11 MG/DL (ref 6–23)
CALCIUM SERPL-MCNC: 8.6 MG/DL (ref 8.6–10.3)
CARDIAC TROPONIN I PNL SERPL HS: 5 NG/L (ref 0–20)
CHLORIDE SERPL-SCNC: 108 MMOL/L (ref 98–107)
CO2 SERPL-SCNC: 28 MMOL/L (ref 21–32)
CREAT SERPL-MCNC: 0.76 MG/DL (ref 0.5–1.3)
EGFRCR SERPLBLD CKD-EPI 2021: 89 ML/MIN/1.73M*2
EOSINOPHIL # BLD AUTO: 0.11 X10*3/UL (ref 0–0.4)
EOSINOPHIL NFR BLD AUTO: 1.6 %
ERYTHROCYTE [DISTWIDTH] IN BLOOD BY AUTOMATED COUNT: 13.2 % (ref 11.5–14.5)
GLUCOSE SERPL-MCNC: 91 MG/DL (ref 74–99)
HCT VFR BLD AUTO: 42.9 % (ref 41–52)
HGB BLD-MCNC: 14.3 G/DL (ref 13.5–17.5)
IMM GRANULOCYTES # BLD AUTO: 0.02 X10*3/UL (ref 0–0.5)
IMM GRANULOCYTES NFR BLD AUTO: 0.3 % (ref 0–0.9)
LYMPHOCYTES # BLD AUTO: 2.41 X10*3/UL (ref 0.8–3)
LYMPHOCYTES NFR BLD AUTO: 35.2 %
MCH RBC QN AUTO: 33.2 PG (ref 26–34)
MCHC RBC AUTO-ENTMCNC: 33.3 G/DL (ref 32–36)
MCV RBC AUTO: 100 FL (ref 80–100)
MONOCYTES # BLD AUTO: 0.45 X10*3/UL (ref 0.05–0.8)
MONOCYTES NFR BLD AUTO: 6.6 %
NEUTROPHILS # BLD AUTO: 3.78 X10*3/UL (ref 1.6–5.5)
NEUTROPHILS NFR BLD AUTO: 55.3 %
NRBC BLD-RTO: 0 /100 WBCS (ref 0–0)
PLATELET # BLD AUTO: 263 X10*3/UL (ref 150–450)
POTASSIUM SERPL-SCNC: 5.3 MMOL/L (ref 3.5–5.3)
POTASSIUM SERPL-SCNC: 5.9 MMOL/L (ref 3.5–5.3)
PROT SERPL-MCNC: 5.9 G/DL (ref 6.4–8.2)
RBC # BLD AUTO: 4.31 X10*6/UL (ref 4.5–5.9)
SODIUM SERPL-SCNC: 140 MMOL/L (ref 136–145)
WBC # BLD AUTO: 6.8 X10*3/UL (ref 4.4–11.3)

## 2024-04-07 PROCEDURE — 84484 ASSAY OF TROPONIN QUANT: CPT | Performed by: NURSE PRACTITIONER

## 2024-04-07 PROCEDURE — 71045 X-RAY EXAM CHEST 1 VIEW: CPT

## 2024-04-07 PROCEDURE — 36415 COLL VENOUS BLD VENIPUNCTURE: CPT | Performed by: NURSE PRACTITIONER

## 2024-04-07 PROCEDURE — 99285 EMERGENCY DEPT VISIT HI MDM: CPT | Mod: 25

## 2024-04-07 PROCEDURE — 70450 CT HEAD/BRAIN W/O DYE: CPT | Performed by: RADIOLOGY

## 2024-04-07 PROCEDURE — 85025 COMPLETE CBC W/AUTO DIFF WBC: CPT | Performed by: NURSE PRACTITIONER

## 2024-04-07 PROCEDURE — 71045 X-RAY EXAM CHEST 1 VIEW: CPT | Performed by: RADIOLOGY

## 2024-04-07 PROCEDURE — 80053 COMPREHEN METABOLIC PANEL: CPT | Performed by: NURSE PRACTITIONER

## 2024-04-07 PROCEDURE — 93005 ELECTROCARDIOGRAM TRACING: CPT

## 2024-04-07 PROCEDURE — 70450 CT HEAD/BRAIN W/O DYE: CPT

## 2024-04-07 PROCEDURE — 83880 ASSAY OF NATRIURETIC PEPTIDE: CPT | Performed by: STUDENT IN AN ORGANIZED HEALTH CARE EDUCATION/TRAINING PROGRAM

## 2024-04-07 PROCEDURE — 84132 ASSAY OF SERUM POTASSIUM: CPT | Mod: 59 | Performed by: STUDENT IN AN ORGANIZED HEALTH CARE EDUCATION/TRAINING PROGRAM

## 2024-04-07 ASSESSMENT — COLUMBIA-SUICIDE SEVERITY RATING SCALE - C-SSRS
6. HAVE YOU EVER DONE ANYTHING, STARTED TO DO ANYTHING, OR PREPARED TO DO ANYTHING TO END YOUR LIFE?: NO
1. IN THE PAST MONTH, HAVE YOU WISHED YOU WERE DEAD OR WISHED YOU COULD GO TO SLEEP AND NOT WAKE UP?: NO
2. HAVE YOU ACTUALLY HAD ANY THOUGHTS OF KILLING YOURSELF?: NO

## 2024-04-07 ASSESSMENT — PAIN - FUNCTIONAL ASSESSMENT: PAIN_FUNCTIONAL_ASSESSMENT: 0-10

## 2024-04-07 ASSESSMENT — PAIN DESCRIPTION - PROGRESSION: CLINICAL_PROGRESSION: NOT CHANGED

## 2024-04-07 NOTE — ED NOTES
Dr. Mayer aware of elevated BP.  Pt denies cp, back pain, headaches and dizziness. Pt is A&Ox3 and ambulatory     Lisseth Ivey RN  04/07/24 [id number]

## 2024-04-07 NOTE — ED TRIAGE NOTES
"This patient was seen in triage.     Vitals are noted.      HPI:  Patient is an 83-year-old male with past medical history of hypertension presenting to the emergency department with episode of dizziness last night, states that it improved when he was laying down but had a few minutes where he felt like his \"vision was going up and down\" does report that he had an injection for macular degeneration to his eye yesterday and thought that the visual changes was due to the injection.  Woke up today without any dizziness, chest pain, shortness of breath, abdominal pain, nausea, vomiting.  Was noted to be hypertensive and states he was concerned due to his blood pressure, is compliant with his medications.    Focused PE:  Gen: Well-appearing, not in acute distress  Cardiovascular: Regular rate, normal rhythm, no murmur, no gallop  Respiratory: No adventitious lung sounds auscultated.  Abdomen: No reproducible abdominal tenderness upon palpation,  physical exam may be limited by patient positioning sitting up in a chair  Neuro:  Alert and Oriented, speech clear and coherent, NIH 0, no nystagmus on exam     Plan:  IV, lab work, EKG, imaging        For the remainder of the patient's workup and ED course, please see the main ED provider note.  We discussed need for diagnostic testing including lab studies and imaging.  We also discussed that they may be asked to wait in the waiting room while these test are pending.  They understand that if they choose to leave without having the testing completed or resulted that we cannot rule out acute life-threatening illnesses and the risks involved to lead to worsening condition, permanent disability or even death.  "

## 2024-04-07 NOTE — ED TRIAGE NOTES
Patient arrives from home with complaints of dizziness with low heart rate and elevated bp.  Patient states it started around 1 pm yesterday and has been on and off since then.

## 2024-04-07 NOTE — ED PROVIDER NOTES
HPI   Chief Complaint   Patient presents with    Dizziness     Patient arrives from home with complaints of dizziness with low heart rate and elevated bp.  Patient states it started around 1 pm yesterday and has been on and off since then.       HPI     Patient is a 83-year-old male with history of calcifications, CAD, hypertension, hyperlipidemia, peripheral vascular disease presented to the emergency department today in the setting of a dizzy episode yesterday.  Just had an injection to the [facility] for his macular degeneration he was.  Last night he felt dizzy.  Today he felt okay but noted that his blood pressure was elevated when he checked it prompting him to come in for evaluation. Denies any chest pain or shortness of breath.       Wife noted that he was hypertensive normally runs in the 150s and was 180s to 190s this morning prompting her to bring her in for evaluation.  Patient himself.  Denies any acute complaints denies any chest pain, shortness of breath, abdominal pain, numbness, tingling, headache.  States his vision is now back to baseline with his glasses on.  Denies any recent falls, trauma.          No data recorded                     Patient History   Past Medical History:   Diagnosis Date    Abrasion of unspecified ear, initial encounter     Abrasion of ear canal    Atherosclerosis of aorta (CMS/HCC) 08/03/2022    Aortic calcification    Atherosclerotic heart disease of native coronary artery without angina pectoris 08/03/2022    ASCVD (arteriosclerotic cardiovascular disease)    Benign prostatic hyperplasia without lower urinary tract symptoms     BPH (benign prostatic hyperplasia)    Encounter for screening for malignant neoplasm of colon     Screening for colorectal cancer    Encounter for screening for malignant neoplasm of prostate     Prostate cancer screening    Hemoptysis 02/22/2022    Hemoptysis    Other chronic pancreatitis (CMS/HCC)     Other chronic pancreatitis    Peripheral vascular  disease, unspecified (CMS/[facility]) 08/03/2022    PVD (peripheral vascular disease)    Personal history of diseases of the skin and subcutaneous tissue     History of dermatitis    Personal history of other diseases of the circulatory system     History of coronary atherosclerosis    Personal history of other diseases of the circulatory system 11/18/2014    History of hypertension    Personal history of other diseases of the nervous system and sense organs 11/08/2016    History of hearing loss    Personal history of other diseases of the respiratory system     History of upper respiratory infection    Personal history of other endocrine, nutritional and metabolic disease     History of hypothyroidism    Personal history of other endocrine, nutritional and metabolic disease 11/18/2014    History of hypercholesterolemia    Personal history of other infectious and parasitic diseases     History of shingles    Personal history of other specified conditions     History of abdominal pain    Pleurodynia 03/30/2022    Rib pain on right side    Radiculopathy, lumbar region 04/28/2022    Chronic lumbar radiculopathy    Rash and other nonspecific skin eruption 03/30/2022    Rash    Strain of muscle, fascia and tendon of lower back, initial encounter     Lumbosacral strain    Strain of other muscle(s) and tendon(s) of posterior muscle group at lower leg level, unspecified leg, initial encounter     Gastrocnemius muscle strain     Past Surgical History:   Procedure Laterality Date    CATARACT EXTRACTION  11/18/2014    Cataract Surgery    HERNIA REPAIR  11/18/2014    Hernia Repair    OTHER SURGICAL HISTORY  09/21/2021    Excision of squamous cell carcinoma     Family History   Problem Relation Name Age of Onset    Other (cardiac disorder) Mother      Other (cardiac disorder) Father      Stroke Other       Social History     Tobacco Use    Smoking status: Never    Smokeless tobacco: Never   Vaping Use    Vaping Use: Never used    Substance Use Topics    Alcohol use: Never    Drug use: Never       Physical Exam   ED Triage Vitals [04/07/24 1057]   Temperature Heart Rate Resp BP   36.5 °C (97.7 °F) 55 -- (!) 212/96      Pulse Ox Temp Source Heart Rate Source Patient Position   100 % Temporal Monitor Sitting      BP Location FiO2 (%)     Right arm --       Physical Exam  Vitals and nursing note reviewed.   Constitutional:       General: He is not in acute distress.     Appearance: He is well-developed.   HENT:      Head: Normocephalic and atraumatic.   Eyes:      Conjunctiva/sclera: Conjunctivae normal.   Cardiovascular:      Rate and Rhythm: Normal rate and regular rhythm.      Heart sounds: No murmur heard.  Pulmonary:      Effort: Pulmonary effort is normal. No respiratory distress.      Breath sounds: Normal breath sounds.   Abdominal:      Palpations: Abdomen is soft.      Tenderness: There is no abdominal tenderness.   Musculoskeletal:         General: No swelling.      Cervical back: Neck supple.   Skin:     General: Skin is warm and dry.      Capillary Refill: Capillary refill takes less than 2 seconds.   Neurological:      General: No focal deficit present.      Mental Status: He is alert and oriented to person, place, and time.      Cranial Nerves: No cranial nerve deficit.      Sensory: No sensory deficit.      Motor: No weakness.      Coordination: Coordination normal.   Psychiatric:         Mood and Affect: Mood normal.         ED Course & MDM   ED Course as of 04/09/24 0709   Sun Apr 07, 2024   1138 XR chest 1 view  No acute process [AH]   1140 EKG as interpreted by myself: rate 55, , QRS 96, , no STEMI. Impression: sinus bradycardia. []   1209 CT head wo IV contrast  No acute intracranial process []      ED Course User Index  [AH] Angella Mayer MD         Diagnoses as of 04/09/24 0709   Dizziness   Secondary hypertension       Medical Decision Making  Patient is a pleasant 83-year-old male presenting to the  emergency department as noted in the setting of an episode of possible dizziness but described more as vision changes initially yesterday and then this morning since resolved.  Patient is hypertensive otherwise stable.  EKG is sinus cardiac as noted.  Patient is moving all extremities neurologically intact no ataxia visual fields and extraocular movements intact.  Symmetric pulses.  Overall reassuring at this time patient asymptomatic otherwise we will monitor blood pressure workup being pursued and started in triage including CT head x-ray and labs.  Follows with Dr. [place] Nath.Ambulated here and on my observation and stable gait.    Patient's workup is reviewed including labs for which potassium is hemolyzed repeated and within normal.  CT and x-ray negative.  Patient remained stable on repeat evaluation, symptoms are fully resolved.  Blood pressure improved.  Discussed findings at this time and given reassuring exam and asymptomatic otherwise plan for likely discharge.  Patient in agreement has a scheduled outpatient follow-up with his primary care provider.  Discharged home.    Procedure  Procedures     Angella Mayer MD  04/09/24 0752

## 2024-04-08 LAB
ATRIAL RATE: 54 BPM
P AXIS: 62 DEGREES
PR INTERVAL: 198 MS
Q ONSET: 252 MS
QRS COUNT: 9 BEATS
QRS DURATION: 96 MS
QT INTERVAL: 457 MS
QTC CALCULATION(BAZETT): 438 MS
QTC FREDERICIA: 444 MS
R AXIS: 4 DEGREES
T AXIS: 30 DEGREES
T OFFSET: 480 MS
VENTRICULAR RATE: 55 BPM

## 2024-04-10 ENCOUNTER — OFFICE VISIT (OUTPATIENT)
Dept: PRIMARY CARE | Facility: CLINIC | Age: 84
End: 2024-04-10
Payer: MEDICARE

## 2024-04-10 VITALS
DIASTOLIC BLOOD PRESSURE: 82 MMHG | SYSTOLIC BLOOD PRESSURE: 146 MMHG | OXYGEN SATURATION: 96 % | BODY MASS INDEX: 26.22 KG/M2 | HEART RATE: 53 BPM | RESPIRATION RATE: 18 BRPM | WEIGHT: 177 LBS | HEIGHT: 69 IN

## 2024-04-10 DIAGNOSIS — I73.9 PVD (PERIPHERAL VASCULAR DISEASE) (CMS-HCC): ICD-10-CM

## 2024-04-10 DIAGNOSIS — E78.00 HYPERCHOLESTEROLEMIA: ICD-10-CM

## 2024-04-10 DIAGNOSIS — I25.10 ASCVD (ARTERIOSCLEROTIC CARDIOVASCULAR DISEASE): Primary | ICD-10-CM

## 2024-04-10 DIAGNOSIS — I10 BENIGN ESSENTIAL HYPERTENSION: ICD-10-CM

## 2024-04-10 DIAGNOSIS — I10 ESSENTIAL (PRIMARY) HYPERTENSION: ICD-10-CM

## 2024-04-10 DIAGNOSIS — E03.9 HYPOTHYROIDISM, UNSPECIFIED TYPE: ICD-10-CM

## 2024-04-10 DIAGNOSIS — K42.9 UMBILICAL HERNIA WITHOUT OBSTRUCTION AND WITHOUT GANGRENE: ICD-10-CM

## 2024-04-10 PROCEDURE — 3079F DIAST BP 80-89 MM HG: CPT | Performed by: INTERNAL MEDICINE

## 2024-04-10 PROCEDURE — 1159F MED LIST DOCD IN RCRD: CPT | Performed by: INTERNAL MEDICINE

## 2024-04-10 PROCEDURE — 99214 OFFICE O/P EST MOD 30 MIN: CPT | Performed by: INTERNAL MEDICINE

## 2024-04-10 PROCEDURE — 3077F SYST BP >= 140 MM HG: CPT | Performed by: INTERNAL MEDICINE

## 2024-04-10 PROCEDURE — 1126F AMNT PAIN NOTED NONE PRSNT: CPT | Performed by: INTERNAL MEDICINE

## 2024-04-10 RX ORDER — MV-MIN/FA/VIT K/LUTEIN/ZEAXANT 200MCG-5MG
1 CAPSULE ORAL 2 TIMES DAILY
COMMUNITY

## 2024-04-10 RX ORDER — LOSARTAN POTASSIUM 100 MG/1
100 TABLET ORAL DAILY
Qty: 90 TABLET | Refills: 3 | Status: SHIPPED | OUTPATIENT
Start: 2024-04-10

## 2024-04-10 ASSESSMENT — PAIN SCALES - GENERAL: PAINLEVEL: 0-NO PAIN

## 2024-04-13 NOTE — PROGRESS NOTES
"Subjective   Modesto Velarde is a 83 y.o. male who presents for Follow-up.  Patient presents for follow-up from an ER visit.  Accompanied by wife.  He went to the ED on Sunday for hypertension and bradycardia.  He is having dizziness.  He had a CT scan, chest x-ray, and EKG that were okay.  Recent blood work reviewed.  Patient to increase Synthroid to 7 days a week.        Objective     /82 (BP Location: Right arm, Patient Position: Sitting, BP Cuff Size: Adult)   Pulse 53   Resp 18   Ht 1.753 m (5' 9\")   Wt 80.3 kg (177 lb)   SpO2 96%   BMI 26.14 kg/m²      Physical Exam  Constitutional:       Appearance: Normal appearance.      Comments: Elderly male   HENT:      Head: Normocephalic and atraumatic.      Nose: Nose normal.      Mouth/Throat:      Mouth: Mucous membranes are moist.      Pharynx: Oropharynx is clear.   Eyes:      Extraocular Movements: Extraocular movements intact.      Pupils: Pupils are equal, round, and reactive to light.   Cardiovascular:      Rate and Rhythm: Normal rate and regular rhythm.   Pulmonary:      Effort: No respiratory distress.      Breath sounds: Normal breath sounds. No wheezing, rhonchi or rales.   Abdominal:      General: Bowel sounds are normal. There is no distension.      Palpations: Abdomen is soft.      Tenderness: There is no abdominal tenderness. There is no guarding.      Comments: Umbilical hernia, nontender   Musculoskeletal:      Right lower leg: No edema.      Left lower leg: No edema.   Skin:     General: Skin is warm and dry.   Neurological:      Mental Status: He is alert and oriented to person, place, and time. Mental status is at baseline.   Psychiatric:         Mood and Affect: Mood normal.         Behavior: Behavior normal.     Assessment/Plan   Problem List Items Addressed This Visit       ASCVD (arteriosclerotic cardiovascular disease) - Primary     Continue current medications         Benign essential hypertension     Continue current " medications         Hypercholesterolemia     Continue current medications         Hypothyroidism     Continue current medications         PVD (peripheral vascular disease) (CMS-HCC)     Continue current medications         Umbilical hernia     Following with surgery and considering repair          Other Visit Diagnoses       Essential (primary) hypertension        Relevant Medications    losartan (Cozaar) 100 mg tablet          Increase Synthroid to 7 days a week       Maurice Nath DO

## 2024-09-11 DIAGNOSIS — E03.9 HYPOTHYROIDISM, UNSPECIFIED TYPE: ICD-10-CM

## 2024-09-11 DIAGNOSIS — E78.00 HYPERCHOLESTEROLEMIA: ICD-10-CM

## 2024-09-12 RX ORDER — ATORVASTATIN CALCIUM 20 MG/1
20 TABLET, FILM COATED ORAL DAILY
Qty: 90 TABLET | Refills: 3 | Status: SHIPPED | OUTPATIENT
Start: 2024-09-12

## 2024-09-12 RX ORDER — LEVOTHYROXINE SODIUM 112 UG/1
112 TABLET ORAL
Qty: 90 TABLET | Refills: 3 | Status: SHIPPED | OUTPATIENT
Start: 2024-09-12

## 2024-10-09 ENCOUNTER — APPOINTMENT (OUTPATIENT)
Dept: PRIMARY CARE | Facility: CLINIC | Age: 84
End: 2024-10-09
Payer: MEDICARE

## 2024-10-15 ENCOUNTER — APPOINTMENT (OUTPATIENT)
Dept: PRIMARY CARE | Facility: CLINIC | Age: 84
End: 2024-10-15
Payer: MEDICARE

## 2024-10-22 ENCOUNTER — APPOINTMENT (OUTPATIENT)
Dept: PRIMARY CARE | Facility: CLINIC | Age: 84
End: 2024-10-22
Payer: MEDICARE

## 2024-10-22 ENCOUNTER — LAB (OUTPATIENT)
Dept: LAB | Facility: LAB | Age: 84
End: 2024-10-22
Payer: MEDICARE

## 2024-10-22 VITALS
SYSTOLIC BLOOD PRESSURE: 136 MMHG | DIASTOLIC BLOOD PRESSURE: 68 MMHG | OXYGEN SATURATION: 97 % | BODY MASS INDEX: 25.77 KG/M2 | HEART RATE: 63 BPM | HEIGHT: 69 IN | WEIGHT: 174 LBS

## 2024-10-22 DIAGNOSIS — E55.9 VITAMIN D DEFICIENCY, UNSPECIFIED: ICD-10-CM

## 2024-10-22 DIAGNOSIS — Z00.00 ENCOUNTER FOR ANNUAL WELLNESS VISIT (AWV) IN MEDICARE PATIENT: ICD-10-CM

## 2024-10-22 DIAGNOSIS — E87.5 HYPERKALEMIA: ICD-10-CM

## 2024-10-22 DIAGNOSIS — E03.9 HYPOTHYROIDISM, UNSPECIFIED TYPE: ICD-10-CM

## 2024-10-22 DIAGNOSIS — R79.9 ABNORMAL FINDING OF BLOOD CHEMISTRY, UNSPECIFIED: ICD-10-CM

## 2024-10-22 DIAGNOSIS — I10 BENIGN ESSENTIAL HYPERTENSION: ICD-10-CM

## 2024-10-22 DIAGNOSIS — E03.9 HYPOTHYROIDISM, UNSPECIFIED TYPE: Primary | ICD-10-CM

## 2024-10-22 DIAGNOSIS — E78.00 HYPERCHOLESTEROLEMIA: ICD-10-CM

## 2024-10-22 DIAGNOSIS — I25.10 ASCVD (ARTERIOSCLEROTIC CARDIOVASCULAR DISEASE): ICD-10-CM

## 2024-10-22 LAB
25(OH)D3 SERPL-MCNC: 44 NG/ML (ref 30–100)
ALBUMIN SERPL BCP-MCNC: 4.2 G/DL (ref 3.4–5)
ALP SERPL-CCNC: 62 U/L (ref 33–136)
ALT SERPL W P-5'-P-CCNC: 16 U/L (ref 10–52)
ANION GAP SERPL CALC-SCNC: 13 MMOL/L (ref 10–20)
AST SERPL W P-5'-P-CCNC: 14 U/L (ref 9–39)
BILIRUB SERPL-MCNC: 0.5 MG/DL (ref 0–1.2)
BUN SERPL-MCNC: 13 MG/DL (ref 6–23)
CALCIUM SERPL-MCNC: 9.6 MG/DL (ref 8.6–10.3)
CHLORIDE SERPL-SCNC: 107 MMOL/L (ref 98–107)
CO2 SERPL-SCNC: 28 MMOL/L (ref 21–32)
CREAT SERPL-MCNC: 0.83 MG/DL (ref 0.5–1.3)
EGFRCR SERPLBLD CKD-EPI 2021: 86 ML/MIN/1.73M*2
ERYTHROCYTE [DISTWIDTH] IN BLOOD BY AUTOMATED COUNT: 13.1 % (ref 11.5–14.5)
GLUCOSE SERPL-MCNC: 86 MG/DL (ref 74–99)
HCT VFR BLD AUTO: 40.6 % (ref 41–52)
HGB BLD-MCNC: 13.3 G/DL (ref 13.5–17.5)
MCH RBC QN AUTO: 32.6 PG (ref 26–34)
MCHC RBC AUTO-ENTMCNC: 32.8 G/DL (ref 32–36)
MCV RBC AUTO: 100 FL (ref 80–100)
NRBC BLD-RTO: 0 /100 WBCS (ref 0–0)
PLATELET # BLD AUTO: 299 X10*3/UL (ref 150–450)
POTASSIUM SERPL-SCNC: 5.4 MMOL/L (ref 3.5–5.3)
PROT SERPL-MCNC: 6.1 G/DL (ref 6.4–8.2)
PSA SERPL-MCNC: 3.06 NG/ML
RBC # BLD AUTO: 4.08 X10*6/UL (ref 4.5–5.9)
SODIUM SERPL-SCNC: 143 MMOL/L (ref 136–145)
T4 FREE SERPL-MCNC: 1.31 NG/DL (ref 0.61–1.12)
TSH SERPL-ACNC: 0.51 MIU/L (ref 0.44–3.98)
WBC # BLD AUTO: 7.8 X10*3/UL (ref 4.4–11.3)

## 2024-10-22 PROCEDURE — 1126F AMNT PAIN NOTED NONE PRSNT: CPT | Performed by: INTERNAL MEDICINE

## 2024-10-22 PROCEDURE — 36415 COLL VENOUS BLD VENIPUNCTURE: CPT

## 2024-10-22 PROCEDURE — 3075F SYST BP GE 130 - 139MM HG: CPT | Performed by: INTERNAL MEDICINE

## 2024-10-22 PROCEDURE — 84443 ASSAY THYROID STIM HORMONE: CPT

## 2024-10-22 PROCEDURE — 99214 OFFICE O/P EST MOD 30 MIN: CPT | Performed by: INTERNAL MEDICINE

## 2024-10-22 PROCEDURE — 82306 VITAMIN D 25 HYDROXY: CPT

## 2024-10-22 PROCEDURE — G0439 PPPS, SUBSEQ VISIT: HCPCS | Performed by: INTERNAL MEDICINE

## 2024-10-22 PROCEDURE — 84153 ASSAY OF PSA TOTAL: CPT

## 2024-10-22 PROCEDURE — 1159F MED LIST DOCD IN RCRD: CPT | Performed by: INTERNAL MEDICINE

## 2024-10-22 PROCEDURE — 80053 COMPREHEN METABOLIC PANEL: CPT

## 2024-10-22 PROCEDURE — 83036 HEMOGLOBIN GLYCOSYLATED A1C: CPT

## 2024-10-22 PROCEDURE — 3078F DIAST BP <80 MM HG: CPT | Performed by: INTERNAL MEDICINE

## 2024-10-22 PROCEDURE — 85027 COMPLETE CBC AUTOMATED: CPT

## 2024-10-22 PROCEDURE — 84439 ASSAY OF FREE THYROXINE: CPT

## 2024-10-22 ASSESSMENT — PAIN SCALES - GENERAL: PAINLEVEL_OUTOF10: 0-NO PAIN

## 2024-10-23 LAB
EST. AVERAGE GLUCOSE BLD GHB EST-MCNC: 108 MG/DL
HBA1C MFR BLD: 5.4 %

## 2024-10-29 RX ORDER — LEVOTHYROXINE SODIUM 100 UG/1
100 TABLET ORAL DAILY
Qty: 90 TABLET | Refills: 3 | Status: SHIPPED | OUTPATIENT
Start: 2024-10-29 | End: 2025-10-29

## 2024-12-06 ENCOUNTER — LAB (OUTPATIENT)
Dept: LAB | Facility: LAB | Age: 84
End: 2024-12-06
Payer: MEDICARE

## 2024-12-06 ENCOUNTER — OFFICE VISIT (OUTPATIENT)
Dept: PRIMARY CARE | Facility: CLINIC | Age: 84
End: 2024-12-06
Payer: MEDICARE

## 2024-12-06 VITALS
HEART RATE: 53 BPM | OXYGEN SATURATION: 96 % | SYSTOLIC BLOOD PRESSURE: 160 MMHG | WEIGHT: 167.5 LBS | HEIGHT: 69 IN | BODY MASS INDEX: 24.81 KG/M2 | DIASTOLIC BLOOD PRESSURE: 80 MMHG

## 2024-12-06 DIAGNOSIS — E87.5 HYPERKALEMIA: Primary | ICD-10-CM

## 2024-12-06 DIAGNOSIS — E87.5 HYPERKALEMIA: ICD-10-CM

## 2024-12-06 DIAGNOSIS — E03.9 HYPOTHYROIDISM, UNSPECIFIED TYPE: ICD-10-CM

## 2024-12-06 DIAGNOSIS — R39.9 LOWER URINARY TRACT SYMPTOMS (LUTS): ICD-10-CM

## 2024-12-06 PROCEDURE — 84132 ASSAY OF SERUM POTASSIUM: CPT

## 2024-12-06 PROCEDURE — 3077F SYST BP >= 140 MM HG: CPT | Performed by: FAMILY MEDICINE

## 2024-12-06 PROCEDURE — 36415 COLL VENOUS BLD VENIPUNCTURE: CPT

## 2024-12-06 PROCEDURE — 1036F TOBACCO NON-USER: CPT | Performed by: FAMILY MEDICINE

## 2024-12-06 PROCEDURE — G2211 COMPLEX E/M VISIT ADD ON: HCPCS | Performed by: FAMILY MEDICINE

## 2024-12-06 PROCEDURE — 99214 OFFICE O/P EST MOD 30 MIN: CPT | Performed by: FAMILY MEDICINE

## 2024-12-06 PROCEDURE — 3079F DIAST BP 80-89 MM HG: CPT | Performed by: FAMILY MEDICINE

## 2024-12-06 PROCEDURE — 1123F ACP DISCUSS/DSCN MKR DOCD: CPT | Performed by: FAMILY MEDICINE

## 2024-12-06 PROCEDURE — 1158F ADVNC CARE PLAN TLK DOCD: CPT | Performed by: FAMILY MEDICINE

## 2024-12-06 PROCEDURE — 1160F RVW MEDS BY RX/DR IN RCRD: CPT | Performed by: FAMILY MEDICINE

## 2024-12-06 PROCEDURE — 1159F MED LIST DOCD IN RCRD: CPT | Performed by: FAMILY MEDICINE

## 2024-12-06 RX ORDER — LEVOTHYROXINE SODIUM 112 UG/1
112 TABLET ORAL DAILY
Start: 2024-12-06 | End: 2025-12-06

## 2024-12-06 ASSESSMENT — PATIENT HEALTH QUESTIONNAIRE - PHQ9
1. LITTLE INTEREST OR PLEASURE IN DOING THINGS: NOT AT ALL
SUM OF ALL RESPONSES TO PHQ9 QUESTIONS 1 & 2: 0
2. FEELING DOWN, DEPRESSED OR HOPELESS: NOT AT ALL

## 2024-12-06 NOTE — PATIENT INSTRUCTIONS
Increase dietary protein.  May use a protein supplement like boost or Ensure     Multivitamin with iron daily     obtain repeat potassium today    Obtain TSH and free T4 in 2 months    Obtain hemoglobin in 2 months    Monitor BP daily record  and  FU next Friday

## 2024-12-06 NOTE — PROGRESS NOTES
"Here today establishing care.    Recently had mild hyperkalemia with a potassium of 5.4.  Kayexalate was prescribed but patient did not take because of 300 cost.    Free T4 was elevated while he was on levothyroxine 112 mcg daily.  He does not want to take 100 mcg daily but will reduce his dose by skipping a 112 dose on Sunday.  I think this is reasonable.    Protein level was a bit low at 6.1    Labs showed a hemoglobin of 13.3 slightly decreased from previous with normal WBC and platelets    Had recent colonoscopy with polypectomy ems to at [facility] with  which revealed  Tubular adenoma x 1  Did not take his metoprolol   Desires FU with Urology for LUTs           /80   Pulse 53   Ht 1.753 m (5' 9\")   Wt 76 kg (167 lb 8 oz)   SpO2 96%   BMI 24.74 kg/m²     Appears comfortable  No retractions  Skin without pallor petechia icterus cyanosis  Neck without JVD thyromegaly bruits  Chest wall nontender  Chest clear to auscultation without rales rhonchi wheeze  Heart regular rate and rhythm without murmur  Abdomen soft nondistended nontender without organomegaly or mass  Extremities without erythema edema Homans or cord  Peripheral pulses palpable  Neuro intact          "

## 2024-12-07 LAB — POTASSIUM SERPL-SCNC: 4.6 MMOL/L (ref 3.5–5.3)

## 2024-12-10 ENCOUNTER — TELEPHONE (OUTPATIENT)
Dept: PRIMARY CARE | Facility: CLINIC | Age: 84
End: 2024-12-10
Payer: MEDICARE

## 2024-12-10 NOTE — TELEPHONE ENCOUNTER
PATIENT NOTIFIED    ----- Message from Modesto Feliz sent at 12/9/2024  9:09 AM EST -----  Potassium is normal.  No medication changes required

## 2024-12-13 ENCOUNTER — APPOINTMENT (OUTPATIENT)
Dept: PRIMARY CARE | Facility: CLINIC | Age: 84
End: 2024-12-13
Payer: MEDICARE

## 2024-12-13 VITALS
DIASTOLIC BLOOD PRESSURE: 68 MMHG | WEIGHT: 167 LBS | OXYGEN SATURATION: 97 % | TEMPERATURE: 96.8 F | BODY MASS INDEX: 24.73 KG/M2 | HEIGHT: 69 IN | SYSTOLIC BLOOD PRESSURE: 168 MMHG | HEART RATE: 50 BPM

## 2024-12-13 DIAGNOSIS — R00.2 PALPITATIONS: ICD-10-CM

## 2024-12-13 DIAGNOSIS — I10 BENIGN ESSENTIAL HYPERTENSION: Primary | ICD-10-CM

## 2024-12-13 PROCEDURE — 1160F RVW MEDS BY RX/DR IN RCRD: CPT | Performed by: FAMILY MEDICINE

## 2024-12-13 PROCEDURE — 3077F SYST BP >= 140 MM HG: CPT | Performed by: FAMILY MEDICINE

## 2024-12-13 PROCEDURE — 1159F MED LIST DOCD IN RCRD: CPT | Performed by: FAMILY MEDICINE

## 2024-12-13 PROCEDURE — 1123F ACP DISCUSS/DSCN MKR DOCD: CPT | Performed by: FAMILY MEDICINE

## 2024-12-13 PROCEDURE — 1036F TOBACCO NON-USER: CPT | Performed by: FAMILY MEDICINE

## 2024-12-13 PROCEDURE — 99214 OFFICE O/P EST MOD 30 MIN: CPT | Performed by: FAMILY MEDICINE

## 2024-12-13 PROCEDURE — G2211 COMPLEX E/M VISIT ADD ON: HCPCS | Performed by: FAMILY MEDICINE

## 2024-12-13 PROCEDURE — 3078F DIAST BP <80 MM HG: CPT | Performed by: FAMILY MEDICINE

## 2024-12-13 ASSESSMENT — PATIENT HEALTH QUESTIONNAIRE - PHQ9
2. FEELING DOWN, DEPRESSED OR HOPELESS: NOT AT ALL
1. LITTLE INTEREST OR PLEASURE IN DOING THINGS: NOT AT ALL
SUM OF ALL RESPONSES TO PHQ9 QUESTIONS 1 & 2: 0

## 2024-12-13 NOTE — PROGRESS NOTES
"Here for follow-up regarding hypertension.    Home -174/68-84    Home P 51-60    Seeing Dr. Arnold cardiologist on Monday.  Dr. Arnold did previously put him on metoprolol 25 mg daily presumably for \"premature beats \"after having a normal echo and Holter monitor.  Since he has been on this medication he is felt fatigued and desires to discontinue.  He will review this with Dr. Arnold and then notify me of any medication changes.  He will also notify me of his blood pressure after next week.      Currently denies palpitations syncope chest pain dyspnea orthopnea edema headache dysarthria aphasia focal weakness          /68   Pulse 50   Temp 36 °C (96.8 °F)   Ht 1.753 m (5' 9\")   Wt 75.8 kg (167 lb)   SpO2 97%   BMI 24.66 kg/m²     Skin without pallor or cyanosis  Neck without JVD  Clear to auscultation  Heart regular rate rhythm without murmur  Extremity without erythema edema Homans or cord  "

## 2024-12-15 ENCOUNTER — HOSPITAL ENCOUNTER (EMERGENCY)
Facility: HOSPITAL | Age: 84
Discharge: HOME | End: 2024-12-15
Payer: MEDICARE

## 2024-12-15 ENCOUNTER — APPOINTMENT (OUTPATIENT)
Dept: RADIOLOGY | Facility: HOSPITAL | Age: 84
End: 2024-12-15
Payer: MEDICARE

## 2024-12-15 ENCOUNTER — APPOINTMENT (OUTPATIENT)
Dept: CARDIOLOGY | Facility: HOSPITAL | Age: 84
End: 2024-12-15
Payer: MEDICARE

## 2024-12-15 VITALS
TEMPERATURE: 97.5 F | OXYGEN SATURATION: 97 % | DIASTOLIC BLOOD PRESSURE: 81 MMHG | HEIGHT: 69 IN | SYSTOLIC BLOOD PRESSURE: 180 MMHG | RESPIRATION RATE: 20 BRPM | HEART RATE: 56 BPM | BODY MASS INDEX: 24.73 KG/M2 | WEIGHT: 167 LBS

## 2024-12-15 DIAGNOSIS — I15.9 SECONDARY HYPERTENSION: Primary | ICD-10-CM

## 2024-12-15 LAB
ALBUMIN SERPL BCP-MCNC: 3.8 G/DL (ref 3.4–5)
ALP SERPL-CCNC: 56 U/L (ref 33–136)
ALT SERPL W P-5'-P-CCNC: 12 U/L (ref 10–52)
ANION GAP SERPL CALC-SCNC: 11 MMOL/L (ref 10–20)
AST SERPL W P-5'-P-CCNC: 13 U/L (ref 9–39)
BASOPHILS # BLD AUTO: 0.04 X10*3/UL (ref 0–0.1)
BASOPHILS NFR BLD AUTO: 0.7 %
BILIRUB SERPL-MCNC: 0.5 MG/DL (ref 0–1.2)
BUN SERPL-MCNC: 11 MG/DL (ref 6–23)
CALCIUM SERPL-MCNC: 8.8 MG/DL (ref 8.6–10.3)
CHLORIDE SERPL-SCNC: 107 MMOL/L (ref 98–107)
CO2 SERPL-SCNC: 27 MMOL/L (ref 21–32)
CREAT SERPL-MCNC: 0.8 MG/DL (ref 0.5–1.3)
EGFRCR SERPLBLD CKD-EPI 2021: 87 ML/MIN/1.73M*2
EOSINOPHIL # BLD AUTO: 0.17 X10*3/UL (ref 0–0.4)
EOSINOPHIL NFR BLD AUTO: 2.8 %
ERYTHROCYTE [DISTWIDTH] IN BLOOD BY AUTOMATED COUNT: 13.2 % (ref 11.5–14.5)
GLUCOSE SERPL-MCNC: 102 MG/DL (ref 74–99)
HCT VFR BLD AUTO: 40.5 % (ref 41–52)
HGB BLD-MCNC: 13.5 G/DL (ref 13.5–17.5)
IMM GRANULOCYTES # BLD AUTO: 0.01 X10*3/UL (ref 0–0.5)
IMM GRANULOCYTES NFR BLD AUTO: 0.2 % (ref 0–0.9)
LYMPHOCYTES # BLD AUTO: 1.58 X10*3/UL (ref 0.8–3)
LYMPHOCYTES NFR BLD AUTO: 26.2 %
MAGNESIUM SERPL-MCNC: 1.86 MG/DL (ref 1.6–2.4)
MCH RBC QN AUTO: 33.1 PG (ref 26–34)
MCHC RBC AUTO-ENTMCNC: 33.3 G/DL (ref 32–36)
MCV RBC AUTO: 99 FL (ref 80–100)
MONOCYTES # BLD AUTO: 0.43 X10*3/UL (ref 0.05–0.8)
MONOCYTES NFR BLD AUTO: 7.1 %
NEUTROPHILS # BLD AUTO: 3.81 X10*3/UL (ref 1.6–5.5)
NEUTROPHILS NFR BLD AUTO: 63 %
NRBC BLD-RTO: 0 /100 WBCS (ref 0–0)
PLATELET # BLD AUTO: 257 X10*3/UL (ref 150–450)
POTASSIUM SERPL-SCNC: 4.1 MMOL/L (ref 3.5–5.3)
PROT SERPL-MCNC: 6 G/DL (ref 6.4–8.2)
RBC # BLD AUTO: 4.08 X10*6/UL (ref 4.5–5.9)
SODIUM SERPL-SCNC: 141 MMOL/L (ref 136–145)
WBC # BLD AUTO: 6 X10*3/UL (ref 4.4–11.3)

## 2024-12-15 PROCEDURE — 93005 ELECTROCARDIOGRAM TRACING: CPT

## 2024-12-15 PROCEDURE — 70450 CT HEAD/BRAIN W/O DYE: CPT | Performed by: RADIOLOGY

## 2024-12-15 PROCEDURE — 99285 EMERGENCY DEPT VISIT HI MDM: CPT | Mod: 25

## 2024-12-15 PROCEDURE — 71046 X-RAY EXAM CHEST 2 VIEWS: CPT | Mod: FOREIGN READ | Performed by: RADIOLOGY

## 2024-12-15 PROCEDURE — 85025 COMPLETE CBC W/AUTO DIFF WBC: CPT | Performed by: NURSE PRACTITIONER

## 2024-12-15 PROCEDURE — 83735 ASSAY OF MAGNESIUM: CPT | Performed by: NURSE PRACTITIONER

## 2024-12-15 PROCEDURE — 84075 ASSAY ALKALINE PHOSPHATASE: CPT | Performed by: NURSE PRACTITIONER

## 2024-12-15 PROCEDURE — 70450 CT HEAD/BRAIN W/O DYE: CPT

## 2024-12-15 PROCEDURE — 71046 X-RAY EXAM CHEST 2 VIEWS: CPT

## 2024-12-15 PROCEDURE — 36415 COLL VENOUS BLD VENIPUNCTURE: CPT | Performed by: NURSE PRACTITIONER

## 2024-12-15 ASSESSMENT — LIFESTYLE VARIABLES
HAVE YOU EVER FELT YOU SHOULD CUT DOWN ON YOUR DRINKING: NO
EVER HAD A DRINK FIRST THING IN THE MORNING TO STEADY YOUR NERVES TO GET RID OF A HANGOVER: NO
HAVE PEOPLE ANNOYED YOU BY CRITICIZING YOUR DRINKING: NO
TOTAL SCORE: 0
EVER FELT BAD OR GUILTY ABOUT YOUR DRINKING: NO

## 2024-12-15 NOTE — ED TRIAGE NOTES
PATIENT BIBA WITH COMPLAINTS OF HYPERTENSION. THE PATIENT TOOK HIS BP AT HOME AND WAS HYPERTENSIVE AND BRADYCARDIC. UPON EMS ARRIVAL THE PATIENTS SBP 'S. THE PATIENT ALSO COMPLAINS OF DIZZINESS. THE PATIENT DENIES SOB AND CHEST PAIN.

## 2024-12-15 NOTE — ED PROVIDER NOTES
HPI   Chief Complaint   Patient presents with    Hypertension         History provided by:  Patient   used: No      84-year-old male presents ED for evaluation of hypertension.  Patient states that this morning he had a mild headache and felt momentarily dizzy, wife took his blood pressure which was noted to be in the 230s.  He states that his blood pressure is never this high and this is something new.  He does state that he takes his blood pressure medications compliantly but did not take his metoprolol in 2 days because he states that he does not like the way it makes him feel, he states his heart rate is always in the 50s and believes that when he takes metoprolol his heart rate drops lower.  He is taken no additional medications at home for his symptoms.  He denies any trauma, fall, injury or anticoagulation use.  Denies any smoking, drugs or alcohol.  Denies any vision changes, neck pain, fevers, numbness, tingling, weakness, inability to ambulate, incontinence of bowel or bladder, back pain, chest pain, shortness of breath, abdominal pain, nausea, vomiting, diarrhea or any additional symptoms or complaints this time.    Wife to present to bedside and states that he does see his cardiologist tomorrow.  She states that he has not eaten since 4 PM yesterday and this could be contributing to his symptoms at this time.  She denies him having any strokelike symptoms at home.    ROS is otherwise negative unless stated above.      Patient History   Past Medical History:   Diagnosis Date    Abrasion of unspecified ear, initial encounter     Abrasion of ear canal    Atherosclerosis of aorta (CMS-[facility]) 08/03/2022    Aortic calcification    Atherosclerotic heart disease of native coronary artery without angina pectoris 08/03/2022    ASCVD (arteriosclerotic cardiovascular disease)    Benign prostatic hyperplasia without lower urinary tract symptoms     BPH (benign prostatic hyperplasia)    Encounter for  screening for malignant neoplasm of colon     Screening for colorectal cancer    Encounter for screening for malignant neoplasm of prostate     Prostate cancer screening    Hemoptysis 02/22/2022    Hemoptysis    Other chronic pancreatitis     Other chronic pancreatitis    Peripheral vascular disease, unspecified (CMS-HCC) 08/03/2022    PVD (peripheral vascular disease)    Personal history of diseases of the skin and subcutaneous tissue     History of dermatitis    Personal history of other diseases of the circulatory system     History of coronary atherosclerosis    Personal history of other diseases of the circulatory system 11/18/2014    History of hypertension    Personal history of other diseases of the nervous system and sense organs 11/08/2016    History of hearing loss    Personal history of other diseases of the respiratory system     History of upper respiratory infection    Personal history of other endocrine, nutritional and metabolic disease     History of hypothyroidism    Personal history of other endocrine, nutritional and metabolic disease 11/18/2014    History of hypercholesterolemia    Personal history of other infectious and parasitic diseases     History of shingles    Personal history of other specified conditions     History of abdominal pain    Pleurodynia 03/30/2022    Rib pain on right side    Radiculopathy, lumbar region 04/28/2022    Chronic lumbar radiculopathy    Rash and other nonspecific skin eruption 03/30/2022    Rash    Strain of muscle, fascia and tendon of lower back, initial encounter     Lumbosacral strain    Strain of other muscle(s) and tendon(s) of posterior muscle group at lower leg level, unspecified leg, initial encounter     Gastrocnemius muscle strain     Past Surgical History:   Procedure Laterality Date    CATARACT EXTRACTION  11/18/2014    Cataract Surgery    HERNIA REPAIR  11/18/2014    Hernia Repair    OTHER SURGICAL HISTORY  09/21/2021    Excision of squamous cell  carcinoma     Family History   Problem Relation Name Age of Onset    Other (cardiac disorder) Mother      Other (cardiac disorder) Father      Stroke Other       Social History     Tobacco Use    Smoking status: Former     Current packs/day: 0.00     Types: Cigarettes     Quit date:      Years since quittin.9    Smokeless tobacco: Never   Vaping Use    Vaping status: Never Used   Substance Use Topics    Alcohol use: Never    Drug use: Never       Physical Exam   ED Triage Vitals [12/15/24 1033]   Temperature Heart Rate Respirations BP   36.6 °C (97.9 °F) 54 18 (!) 222/92      Pulse Ox Temp Source Heart Rate Source Patient Position   96 % Temporal Monitor --      BP Location FiO2 (%)     Left arm --       Physical Exam  VS: As documented in the triage note and EMR flowsheet from this visit were reviewed.    GEN: NAD, nontoxic, well-appearing, ambulates without difficulty  EYES:  EOMs grossly intact, anicteric sclera, no nystagmus noted, clear and equal bilaterally, no foreign body noted  HEENT: Airway patent  CARD: RRR, nontender chest, no crepitus deformities, no JVD, no murmurs rubs or gallops ; No edema noted.  Positive pulses bilaterally throughout.  Capillary refill less than 3 seconds.  No abnormal redness, warmth, tenderness or swelling noted to bilateral lower extremities.  PULMONARY: Clear all lung fields. Moving air well, Nonlabored, no accessory muscle use, able to speak complete sentences  ABDOMEN: Abdomen soft, non-distended, no rebound, no guarding. Bowel sounds normal in all 4 quadrants. No tenderness to palpation.    : deferred  MUSK: Spine appears normal, range of motion is not limited, no muscle or joint tenderness. Strength 5 out of 5 equal bilaterally throughout.  No step-offs, deformities or additional signs of trauma noted.  No spinal/midline tenderness to palpation  SKIN: Skin normal color for race, warm, dry and intact. No evidence of trauma. No rash noted.  NEURO: Alert and  oriented x 3, speech is clear, no obvious deficits noted. No facial droop noted.    PSYCH: Alert and oriented to person, place, time/situation. normal mood and affect. No apparent risk to self or others. Thoughts are linear.  Does not appear decompensated.  Does not appear internally stimulated.  LYMPH: No adenopathy or splenomegaly. No cervical, supraclavicular or inguinal lymphadenopathy.    ED Course & MDM   Diagnoses as of 12/15/24 1255   Secondary hypertension                 No data recorded     Misti Coma Scale Score: 15 (12/15/24 1042 : Supriya Rivera RN)                           Medical Decision Making    This is an 84-year-old male who presents to the ED for evaluation of higher blood pressure than normal at home prior to arrival.  Patient had a momentary headache and dizziness with ambulation but this is since resolved.  He is placed on continuous cardiac monitor and pulse oximetry.  Noted to be bradycardic in the 50s but patient states that this is nothing new his blood pressure is elevated in the 200s systolic.  He is neurologically intact without any focal neurosigns and moving all extremities without difficulty.  He is otherwise well-appearing with a benign assessment and I do not believe the patient is having any acute aortic dissection or stroke at this time.  Workup was reviewed and unremarkable.  Upon reevaluation he states that he feels improved and reassured, he is just hungry.  He is ambulatory at his baseline without any ataxia or difficulty.  He is asymptomatic while ambulating and his heart rate stayed in the 60s.  He remained otherwise with improved blood pressure throughout ED course of care.  Had a shared discussion with patient and he does follow-up with his cardiologist tomorrow therefore I feel comfortable discharging the patient home and cardiology can further adjust his medications as needed.  I did educate patient that he should take his metoprolol at this time as I do have  concern that is dropping his heart rate under 50 and causing him to be symptomatic.  He will likely need his antihypertensives adjusted.  He is educated maintain proper rest and hydration.  Findings and plan were discussed with patient and wife at bedside agreeable for plan and acknowledged understanding.    EKG Interpretation:Sinus bradycardia at a rate of 57, , , QTc 432 without evidence of STEMI or ischemia    Differential Diagnoses Considered: Hypertensive urgency, electrolyte abnormality, symptomatic bradycardia, acute intracranial process, anxiety, need for medication adjustment, stroke, hunger, dehydration, kidney injury, symptomatic hypertension    Chronic Medical Conditions Significantly Affecting Care: Hypertension    External Records Reviewed: I reviewed recent and relevant outside records including: PCP notes, prior discharge summary, previous radiologic studies, HIE    Independent Interpretation of Studies:  I independently interpreted: CT head revealed no acute intracranial process    Escalation of Care:  Appropriate for outpatient management with cardiology appointment tomorrow as scheduled.  Return precautions discussed and patient verbalized understanding. All questions and concerns were addressed.    Social Determinants of Health Significantly Affecting Care:  None    Prescription Drug Consideration: OTC Tylenol as needed for headache    Diagnostic testing considered: No additional indicated this time    Discussion of Management with Other Providers:   I discussed the patient/results with: None    *Please note that portions of this note may have been completed with a voice recognition program.  Efforts were made to edit the dictations but occasionally, words are mis-transcribed.    Procedure  Procedures     Larisa Dumont, MAICOL-CNP  12/15/24 1994

## 2024-12-16 ENCOUNTER — TELEPHONE (OUTPATIENT)
Dept: PRIMARY CARE | Facility: CLINIC | Age: 84
End: 2024-12-16
Payer: MEDICARE

## 2024-12-16 DIAGNOSIS — E03.9 HYPOTHYROIDISM, UNSPECIFIED TYPE: ICD-10-CM

## 2024-12-16 NOTE — TELEPHONE ENCOUNTER
PT WENT TO ER LAST NIGHT HIS BP /95 HEART RATE 44 THEY DID LET HIM GO HOME BECAUSE HE GOES TO CARDIOLOGY TODAY. BUT WIFE WANTED YOU TO KNOW.

## 2024-12-19 LAB
ATRIAL RATE: 54 BPM
P AXIS: 64 DEGREES
PR INTERVAL: 184 MS
Q ONSET: 252 MS
QRS COUNT: 9 BEATS
QRS DURATION: 104 MS
QT INTERVAL: 455 MS
QTC CALCULATION(BAZETT): 432 MS
QTC FREDERICIA: 439 MS
R AXIS: 15 DEGREES
T AXIS: 49 DEGREES
T OFFSET: 479 MS
VENTRICULAR RATE: 54 BPM

## 2024-12-19 NOTE — TELEPHONE ENCOUNTER
Rx Refill Request Telephone Encounter    Name:  Modesto Velarde  :  [id number]  Medication Name: Levothyroxine 112 MCG        #90 Refill: 1  Take 1 tablet daily Monday through Saturday and skip   Specific Pharmacy location: [facility]  Date of last appointment: 24  Date of next appointment:   Best number to reach patient:

## 2024-12-20 RX ORDER — LEVOTHYROXINE SODIUM 112 UG/1
112 TABLET ORAL DAILY
Qty: 30 TABLET | Refills: 11 | Status: SHIPPED | OUTPATIENT
Start: 2024-12-20 | End: 2025-12-20

## 2024-12-23 ENCOUNTER — OFFICE VISIT (OUTPATIENT)
Dept: PRIMARY CARE | Facility: CLINIC | Age: 84
End: 2024-12-23
Payer: MEDICARE

## 2024-12-23 VITALS
HEART RATE: 58 BPM | TEMPERATURE: 96.6 F | BODY MASS INDEX: 24.59 KG/M2 | WEIGHT: 166 LBS | HEIGHT: 69 IN | DIASTOLIC BLOOD PRESSURE: 75 MMHG | OXYGEN SATURATION: 97 % | SYSTOLIC BLOOD PRESSURE: 182 MMHG

## 2024-12-23 DIAGNOSIS — I10 BENIGN ESSENTIAL HYPERTENSION: Primary | ICD-10-CM

## 2024-12-23 PROCEDURE — 3078F DIAST BP <80 MM HG: CPT | Performed by: FAMILY MEDICINE

## 2024-12-23 PROCEDURE — 1036F TOBACCO NON-USER: CPT | Performed by: FAMILY MEDICINE

## 2024-12-23 PROCEDURE — 3077F SYST BP >= 140 MM HG: CPT | Performed by: FAMILY MEDICINE

## 2024-12-23 PROCEDURE — 1160F RVW MEDS BY RX/DR IN RCRD: CPT | Performed by: FAMILY MEDICINE

## 2024-12-23 PROCEDURE — 1123F ACP DISCUSS/DSCN MKR DOCD: CPT | Performed by: FAMILY MEDICINE

## 2024-12-23 PROCEDURE — 99214 OFFICE O/P EST MOD 30 MIN: CPT | Performed by: FAMILY MEDICINE

## 2024-12-23 PROCEDURE — 1159F MED LIST DOCD IN RCRD: CPT | Performed by: FAMILY MEDICINE

## 2024-12-23 PROCEDURE — G2211 COMPLEX E/M VISIT ADD ON: HCPCS | Performed by: FAMILY MEDICINE

## 2024-12-23 RX ORDER — AMLODIPINE BESYLATE 2.5 MG/1
2.5 TABLET ORAL DAILY
COMMUNITY
End: 2024-12-23 | Stop reason: WASHOUT

## 2024-12-23 RX ORDER — AMLODIPINE BESYLATE 10 MG/1
10 TABLET ORAL DAILY
Qty: 90 TABLET | Refills: 1 | Status: SHIPPED | OUTPATIENT
Start: 2024-12-23 | End: 2025-06-21

## 2024-12-23 ASSESSMENT — PATIENT HEALTH QUESTIONNAIRE - PHQ9
2. FEELING DOWN, DEPRESSED OR HOPELESS: NOT AT ALL
SUM OF ALL RESPONSES TO PHQ9 QUESTIONS 1 & 2: 0
1. LITTLE INTEREST OR PLEASURE IN DOING THINGS: NOT AT ALL

## 2024-12-23 NOTE — PATIENT INSTRUCTIONS
Increase amlodipine to 10 mg a day continue losartan.  Follow-up in January.  Report blood pressure readings in 4 days

## 2024-12-23 NOTE — PROGRESS NOTES
"Here for follow-up regarding high blood pressure.  Recently seen by cardiologist who discontinued his metoprolol.  Several days later had systolic blood pressure of 200 and went to the emergency room for evaluation and was discharged with a blood pressure 150s.  He was started by cardiologist on amlodipine 2.5 mg daily and is continuing his losartan 100 mg daily.  He currently denies any headache double vision difficulty with speech or swallowing chest pain orthopnea leg edema  BP (!) 182/75   Pulse 58   Temp 35.9 °C (96.6 °F)   Ht 1.753 m (5' 9\")   Wt 75.3 kg (166 lb)   SpO2 97%   BMI 24.51 kg/m²       Appears comfortable  No retractions  Skin without pallor petechia icterus cyanosis  Neck without JVD thyromegaly bruits  Chest wall nontender  Chest clear to auscultation without rales rhonchi wheeze  Heart regular rate and rhythm without murmur  Extremities without erythema edema Homans or cord  Peripheral pulses palpable  Neuro intact      "

## 2024-12-27 ENCOUNTER — TELEPHONE (OUTPATIENT)
Dept: PRIMARY CARE | Facility: CLINIC | Age: 84
End: 2024-12-27
Payer: MEDICARE

## 2024-12-27 DIAGNOSIS — I10 BENIGN ESSENTIAL HYPERTENSION: Primary | ICD-10-CM

## 2024-12-27 RX ORDER — CHLORTHALIDONE 25 MG/1
12.5 TABLET ORAL DAILY
Qty: 45 TABLET | Refills: 0 | Status: SHIPPED | OUTPATIENT
Start: 2024-12-27 | End: 2025-03-27

## 2024-12-27 NOTE — TELEPHONE ENCOUNTER
Start chlorthalidone 25 mg half tablet a day.  Continue amlodipine and losartan.  Blood test next Wednesday nonfasting check potassium and kidneys

## 2024-12-27 NOTE — TELEPHONE ENCOUNTER
PT WIFE CALLED IN AND SAYS YOVANA HAS BEEN COMPLAINING ABOUT PRESSURE IN HIS HEAD AND YESTERDAY HIS BLOOD PRESSURE /73 HEART RATE 70 TODAY HIS BP /88 HR OF 62 IS THERE ANYTHING YOU WANT HIM TO CHANGE

## 2025-01-02 ENCOUNTER — LAB (OUTPATIENT)
Dept: LAB | Facility: LAB | Age: 85
End: 2025-01-02
Payer: MEDICARE

## 2025-01-02 DIAGNOSIS — I10 BENIGN ESSENTIAL HYPERTENSION: ICD-10-CM

## 2025-01-02 LAB
ANION GAP SERPL CALC-SCNC: 12 MMOL/L (ref 10–20)
BUN SERPL-MCNC: 16 MG/DL (ref 6–23)
CALCIUM SERPL-MCNC: 9.7 MG/DL (ref 8.6–10.6)
CHLORIDE SERPL-SCNC: 102 MMOL/L (ref 98–107)
CO2 SERPL-SCNC: 29 MMOL/L (ref 21–32)
CREAT SERPL-MCNC: 0.84 MG/DL (ref 0.5–1.3)
EGFRCR SERPLBLD CKD-EPI 2021: 86 ML/MIN/1.73M*2
GLUCOSE SERPL-MCNC: 92 MG/DL (ref 74–99)
POTASSIUM SERPL-SCNC: 4.2 MMOL/L (ref 3.5–5.3)
SODIUM SERPL-SCNC: 139 MMOL/L (ref 136–145)

## 2025-01-02 PROCEDURE — 80048 BASIC METABOLIC PNL TOTAL CA: CPT

## 2025-01-28 ENCOUNTER — APPOINTMENT (OUTPATIENT)
Dept: PRIMARY CARE | Facility: CLINIC | Age: 85
End: 2025-01-28
Payer: MEDICARE

## 2025-02-02 ENCOUNTER — APPOINTMENT (OUTPATIENT)
Dept: RADIOLOGY | Facility: HOSPITAL | Age: 85
End: 2025-02-02
Payer: MEDICARE

## 2025-02-02 ENCOUNTER — HOSPITAL ENCOUNTER (EMERGENCY)
Facility: HOSPITAL | Age: 85
Discharge: HOME | End: 2025-02-02
Payer: MEDICARE

## 2025-02-02 VITALS
BODY MASS INDEX: 24.44 KG/M2 | HEIGHT: 69 IN | OXYGEN SATURATION: 98 % | HEART RATE: 66 BPM | SYSTOLIC BLOOD PRESSURE: 165 MMHG | WEIGHT: 165 LBS | RESPIRATION RATE: 14 BRPM | TEMPERATURE: 96.8 F | DIASTOLIC BLOOD PRESSURE: 73 MMHG

## 2025-02-02 DIAGNOSIS — S09.90XA CLOSED HEAD INJURY, INITIAL ENCOUNTER: ICD-10-CM

## 2025-02-02 DIAGNOSIS — S49.91XA INJURY OF RIGHT SHOULDER, INITIAL ENCOUNTER: ICD-10-CM
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
## 2025-02-02 PROCEDURE — 72125 CT NECK SPINE W/O DYE: CPT | Performed by: RADIOLOGY

## 2025-02-02 PROCEDURE — 73030 X-RAY EXAM OF SHOULDER: CPT | Mod: RIGHT SIDE | Performed by: RADIOLOGY

## 2025-02-02 PROCEDURE — 99284 EMERGENCY DEPT VISIT MOD MDM: CPT | Mod: 25

## 2025-02-02 PROCEDURE — 73030 X-RAY EXAM OF SHOULDER: CPT | Mod: RT

## 2025-02-02 PROCEDURE — 70450 CT HEAD/BRAIN W/O DYE: CPT

## 2025-02-02 PROCEDURE — 70450 CT HEAD/BRAIN W/O DYE: CPT | Performed by: RADIOLOGY

## 2025-02-02 PROCEDURE — 72125 CT NECK SPINE W/O DYE: CPT

## 2025-02-02 ASSESSMENT — LIFESTYLE VARIABLES
EVER HAD A DRINK FIRST THING IN THE MORNING TO STEADY YOUR NERVES TO GET RID OF A HANGOVER: NO
HAVE PEOPLE ANNOYED YOU BY CRITICIZING YOUR DRINKING: NO
EVER FELT BAD OR GUILTY ABOUT YOUR DRINKING: NO
HAVE YOU EVER FELT YOU SHOULD CUT DOWN ON YOUR DRINKING: NO
TOTAL SCORE: 0

## 2025-02-02 ASSESSMENT — COLUMBIA-SUICIDE SEVERITY RATING SCALE - C-SSRS
1. IN THE PAST MONTH, HAVE YOU WISHED YOU WERE DEAD OR WISHED YOU COULD GO TO SLEEP AND NOT WAKE UP?: NO
6. HAVE YOU EVER DONE ANYTHING, STARTED TO DO ANYTHING, OR PREPARED TO DO ANYTHING TO END YOUR LIFE?: NO
2. HAVE YOU ACTUALLY HAD ANY THOUGHTS OF KILLING YOURSELF?: NO

## 2025-02-02 NOTE — ED PROVIDER NOTES
Limitations to History: none  External Records Reviewed  Independent Historians: self  Social determinants affecting care: none    HPI  Modesto Velarde is a 84 y.o. male who presents emergency department due to a fall this morning.  He reports that he slipped on the ice and landed on his back.  He reports that he had immediate pain to the right shoulder.  He had a hard time getting himself up because he could not use his right arm but was able to do so.  He is unsure whether or not he struck his head.  He denies any neck pain or back pain.  He denies any other injuries.  He did not lose consciousness.  He is not on any anticoagulants.  He denies any vision change.  He has not had nausea or vomiting.  He denies any pelvic pain.  His wife is at bedside.  He has no further complaints.    PMH  Past Medical History:   Diagnosis Date    Abrasion of unspecified ear, initial encounter     Abrasion of ear canal    Atherosclerosis of aorta (CMS-[facility]) 08/03/2022    Aortic calcification    Atherosclerotic heart disease of native coronary artery without angina pectoris 08/03/2022    ASCVD (arteriosclerotic cardiovascular disease)    Benign prostatic hyperplasia without lower urinary tract symptoms     BPH (benign prostatic hyperplasia)    Encounter for screening for malignant neoplasm of colon     Screening for colorectal cancer    Encounter for screening for malignant neoplasm of prostate     Prostate cancer screening    Hemoptysis 02/22/2022    Hemoptysis    Other chronic pancreatitis     Other chronic pancreatitis    Peripheral vascular disease, unspecified (CMS-HCC) 08/03/2022    PVD (peripheral vascular disease)    Personal history of diseases of the skin and subcutaneous tissue     History of dermatitis    Personal history of other diseases of the circulatory system     History of coronary atherosclerosis    Personal history of other diseases of the circulatory system 11/18/2014    History of hypertension    Personal  history of other diseases of the nervous system and sense organs 11/08/2016    History of hearing loss    Personal history of other diseases of the respiratory system     History of upper respiratory infection    Personal history of other endocrine, nutritional and metabolic disease     History of hypothyroidism    Personal history of other endocrine, nutritional and metabolic disease 11/18/2014    History of hypercholesterolemia    Personal history of other infectious and parasitic diseases     History of shingles    Personal history of other specified conditions     History of abdominal pain    Pleurodynia 03/30/2022    Rib pain on right side    Radiculopathy, lumbar region 04/28/2022    Chronic lumbar radiculopathy    Rash and other nonspecific skin eruption 03/30/2022    Rash    Strain of muscle, fascia and tendon of lower back, initial encounter     Lumbosacral strain    Strain of other muscle(s) and tendon(s) of posterior muscle group at lower leg level, unspecified leg, initial encounter     Gastrocnemius muscle strain    reviewed by myself.    Meds  Current Outpatient Medications   Medication Instructions    amLODIPine (NORVASC) 10 mg, oral, Daily    aspirin 81 mg EC tablet 1 tablet, oral, Daily    atorvastatin (LIPITOR) 20 mg, oral, Daily    chlorthalidone (HYGROTON) 12.5 mg, oral, Daily    levothyroxine (SYNTHROID, LEVOXYL) 112 mcg, oral, Daily, Monday through Saturday and skip Sunday    losartan (COZAAR) 100 mg, oral, Daily    miscellaneous medical supply (Blood Pressure Cuff) misc Check BP daily and record a log    multivitamin (Daily Multi-Vitamin) tablet 1 tablet, oral, Daily    mv-min-FA-vit K-lutein-zeaxant (PreserVision AREDS 2 Plus MV) 200 mcg-15 mcg- 5 mg-1 mg capsule 1 capsule, oral, 2 times daily    psyllium (Metamucil) 3.4 gram packet oral       Allergies  No Known Allergies reviewed by myself.    SHx  Social History     Tobacco Use    Smoking status: Former     Current packs/day: 0.00      "Types: Cigarettes     Quit date:      Years since quittin.    Smokeless tobacco: Never   Vaping Use    Vaping status: Never Used   Substance Use Topics    Alcohol use: Never    Drug use: Never    reviewed by myself.      ------------------------------------------------------------------------------------------------------------------------------------------    /73   Pulse 66   Temp 36 °C (96.8 °F)   Resp 14   Ht 1.753 m (5' 9\")   Wt 74.8 kg (165 lb)   SpO2 98%   BMI 24.37 kg/m²     Physical Exam  Vitals and nursing note reviewed.   Constitutional:       Appearance: Normal appearance. He is normal weight.   HENT:      Head: Normocephalic. No raccoon eyes or Sandhu's sign.      Right Ear: Tympanic membrane, ear canal and external ear normal. No hemotympanum.      Left Ear: Tympanic membrane, ear canal and external ear normal. No hemotympanum.      Nose: Nose normal.      Mouth/Throat:      Lips: Pink.      Mouth: Mucous membranes are moist.      Pharynx: Oropharynx is clear.      Comments: Dentition intact  Eyes:      Extraocular Movements: Extraocular movements intact.      Conjunctiva/sclera: Conjunctivae normal.      Pupils: Pupils are equal, round, and reactive to light.   Cardiovascular:      Rate and Rhythm: Normal rate and regular rhythm.   Pulmonary:      Effort: Pulmonary effort is normal.      Breath sounds: Normal breath sounds.   Chest:      Comments: No chest wall tenderness.  Abdominal:      General: Abdomen is flat.      Palpations: Abdomen is soft.      Tenderness: There is no abdominal tenderness.   Musculoskeletal:      Right shoulder: Tenderness present. No swelling. Decreased range of motion. Normal pulse.      Right elbow: No swelling. Normal range of motion. No tenderness.      Right wrist: No bony tenderness. Normal range of motion. Normal pulse.      Right hand: No bony tenderness. Normal range of motion. Normal strength. Normal sensation. Normal capillary refill. Normal " pulse.      Cervical back: Normal range of motion and neck supple.      Comments: No response tenderness palpation of the cervical spine, thoracic spine, or lumbar spine.  No step-off or crepitus to palpation of the spine.  Full active range of motion of the cervical, thoracic, lumbar spine with extension, flexion, and lateral rotation.   Skin:     General: Skin is warm and dry.      Capillary Refill: Capillary refill takes less than 2 seconds.      Comments: No bruising to the shoulder or back.   Neurological:      General: No focal deficit present.      Mental Status: He is alert and oriented to person, place, and time.      GCS: GCS eye subscore is 4. GCS verbal subscore is 5. GCS motor subscore is 6.      Cranial Nerves: Cranial nerves 2-12 are intact.      Sensory: Sensation is intact.      Motor: Motor function is intact.      Coordination: Coordination is intact.   Psychiatric:         Attention and Perception: Attention normal.         Mood and Affect: Mood normal.          ------------------------------------------------------------------------------------------------------------------------------------------  Labs  Labs Reviewed - No data to display     Imaging  XR shoulder right 2+ views   Final Result   No acute findings             MACRO:   None        Signed by: Chandra Mariscal 2/2/2025 9:09 AM   Dictation workstation:   [id number]      CT head wo IV contrast   Final Result   No acute intracranial abnormality.        MACRO:   None.        Signed by: Allie Rivas 2/2/2025 8:53 AM   Dictation workstation:   [id number]      CT cervical spine wo IV contrast   Final Result        1. No acute bony abnormalities   2. Cervical spondylosis.        MACRO:   None        Signed by: Allie Rivas 2/2/2025 8:57 AM   Dictation workstation:   [id number]           ED Course  Diagnoses as of 02/02/25 0918   Fall, initial encounter   Closed head injury, initial encounter   Injury of right shoulder, initial encounter         Medical Decision Making: He did not appear ill or toxic.  Vital signs reviewed.  He slightly hypertensive.  Otherwise hemodynamically stable.    Differential diagnoses considered: Proximal humerus fracture, rotator cuff injury, concussion, intracranial hemorrhage, cervical sprain, cervical strain, cervical fracture, others    CT of the head shows no acute intracranial hemorrhage or skull fracture.  CT of the cervical spine showing no acute fracture or subluxation.  X-ray of the shoulder showing no acute fracture.  Possible rotator cuff injury.  I discussed with the patient and his wife about the imaging.  He was placed in a sling.  I recommend he wear the sling for comfort due to the shoulder injury.  He can use ice or take Tylenol for pain control.  I recommend he call orthopedics first thing tomorrow morning for follow-up within 1 week.  He is to return to the ER immediately if symptoms change or worsen.  He verbalized understanding and agreed to plan of care.  He was discharged home in stable condition.    Diagnosis: Fall, head injury, right shoulder injury   Plan: discharge     Joseph Dunbar PA-C  02/02/25 0918

## 2025-02-02 NOTE — DISCHARGE INSTRUCTIONS
The CT of your head and neck both were normal.  X-ray of your shoulder did not show any fracture however there is concern for rotator cuff injury.  You are placed in a sling today to wear for comfort.  Use ice and take Tylenol for pain control.  Please follow-up with orthopedics within 1 week due to your arm injury.  You may have some bruising and swelling due to the injury.  Return to the ER immediately if your symptoms change or worsen

## 2025-02-02 NOTE — ED TRIAGE NOTES
Patient states he slipped on ice this morning- fell backwards. Patient states he has right shoulder pain. Denies blood thinners, no loc but might have hit his head, fall was very fast- denies any head/nec/back pain.

## 2025-02-03 ENCOUNTER — APPOINTMENT (OUTPATIENT)
Dept: UROLOGY | Facility: CLINIC | Age: 85
End: 2025-02-03
Payer: MEDICARE

## 2025-02-03 DIAGNOSIS — N40.1 BENIGN PROSTATIC HYPERPLASIA WITH URINARY HESITANCY: ICD-10-CM

## 2025-02-03 DIAGNOSIS — R39.11 BENIGN PROSTATIC HYPERPLASIA WITH URINARY HESITANCY: ICD-10-CM

## 2025-02-04 ENCOUNTER — APPOINTMENT (OUTPATIENT)
Dept: PRIMARY CARE | Facility: CLINIC | Age: 85
End: 2025-02-04
Payer: MEDICARE

## 2025-02-04 VITALS
SYSTOLIC BLOOD PRESSURE: 145 MMHG | HEIGHT: 69 IN | DIASTOLIC BLOOD PRESSURE: 76 MMHG | WEIGHT: 169 LBS | OXYGEN SATURATION: 99 % | TEMPERATURE: 96.5 F | HEART RATE: 57 BPM | BODY MASS INDEX: 25.03 KG/M2

## 2025-02-04 DIAGNOSIS — S43.421D SPRAIN OF RIGHT ROTATOR CUFF CAPSULE, SUBSEQUENT ENCOUNTER: ICD-10-CM

## 2025-02-04 DIAGNOSIS — I10 BENIGN ESSENTIAL HYPERTENSION: Primary | ICD-10-CM

## 2025-02-04 PROCEDURE — 3078F DIAST BP <80 MM HG: CPT | Performed by: FAMILY MEDICINE

## 2025-02-04 PROCEDURE — 1036F TOBACCO NON-USER: CPT | Performed by: FAMILY MEDICINE

## 2025-02-04 PROCEDURE — G2211 COMPLEX E/M VISIT ADD ON: HCPCS | Performed by: FAMILY MEDICINE

## 2025-02-04 PROCEDURE — 1159F MED LIST DOCD IN RCRD: CPT | Performed by: FAMILY MEDICINE

## 2025-02-04 PROCEDURE — 99214 OFFICE O/P EST MOD 30 MIN: CPT | Performed by: FAMILY MEDICINE

## 2025-02-04 PROCEDURE — 1123F ACP DISCUSS/DSCN MKR DOCD: CPT | Performed by: FAMILY MEDICINE

## 2025-02-04 PROCEDURE — 1160F RVW MEDS BY RX/DR IN RCRD: CPT | Performed by: FAMILY MEDICINE

## 2025-02-04 PROCEDURE — 3077F SYST BP >= 140 MM HG: CPT | Performed by: FAMILY MEDICINE

## 2025-02-04 ASSESSMENT — PATIENT HEALTH QUESTIONNAIRE - PHQ9
SUM OF ALL RESPONSES TO PHQ9 QUESTIONS 1 & 2: 0
2. FEELING DOWN, DEPRESSED OR HOPELESS: NOT AT ALL
1. LITTLE INTEREST OR PLEASURE IN DOING THINGS: NOT AT ALL

## 2025-02-04 NOTE — PROGRESS NOTES
"Slipped and fell on black ice on 2/2/2025 CT of head and neck were unremarkable for acute pathology.  Right shoulder x-ray was negative for fracture.  He was unable to lift his arm up due to weakness and pain was placed in a sling and is following up with Dr. Garcia tomorrow.    Denies headache nausea vomiting incontinence of urine incontinence of stool somnolence irritability visual disturbance.  He denies numbness tingling of his upper extremities.  Blood pressures at home less than 140/90  /76   Pulse 57   Temp 35.8 °C (96.5 °F)   Ht 1.753 m (5' 9\")   Wt 76.7 kg (169 lb)   SpO2 99%   BMI 24.96 kg/m²       Appears comfortable  No retractions  Skin without pallor petechia icterus cyanosis  Neck without JVD thyromegaly bruits  Chest clear to auscultation without rales rhonchi wheeze  Heart regular rate and rhythm without murmur  R arm in sling   Reduced ROM right shoulder   okay  Peripheral pulses palpable        "

## 2025-03-25 DIAGNOSIS — I10 BENIGN ESSENTIAL HYPERTENSION: ICD-10-CM

## 2025-03-25 RX ORDER — CHLORTHALIDONE 25 MG/1
12.5 TABLET ORAL DAILY
Qty: 45 TABLET | Refills: 0 | Status: SHIPPED | OUTPATIENT
Start: 2025-03-25

## 2025-04-01 ENCOUNTER — TELEPHONE (OUTPATIENT)
Dept: PRIMARY CARE | Facility: CLINIC | Age: 85
End: 2025-04-01
Payer: MEDICARE

## 2025-04-01 NOTE — TELEPHONE ENCOUNTER
Pt wanted to let you know you don't need to call in chlorthalidone anymore, the patient is done taking it. He is not going to  the one you called in on 3/25

## 2025-04-03 ENCOUNTER — TELEPHONE (OUTPATIENT)
Dept: PRIMARY CARE | Facility: CLINIC | Age: 85
End: 2025-04-03
Payer: MEDICARE

## 2025-04-03 NOTE — TELEPHONE ENCOUNTER
Pt wanted to let you know he got his CT upper extremity done and the test did find a nodule. He was just calling to see what his next steps are regarding this.

## 2025-04-04 NOTE — TELEPHONE ENCOUNTER
He is scheduled for right shoulder replacement on 5/5. This nodule won't cause any issue with his surgery right? They just want to make sure because his orthopedic surgeon is the one who brought the nodule to his attention.

## 2025-04-24 LAB
NON-UH HIE APPEARANCE, U: NORMAL
NON-UH HIE APTT PATIENT: 29.8 SECONDS (ref 26–36)
NON-UH HIE BILIRUBIN, U: NORMAL
NON-UH HIE BLOOD, U: NORMAL
NON-UH HIE BUN/CREAT RATIO: 11.2
NON-UH HIE BUN: 9 MG/DL (ref 9–23)
NON-UH HIE CALCIUM: 9.3 MG/DL (ref 8.7–10.4)
NON-UH HIE CALCULATED OSMOLALITY: 288 MOSM/KG (ref 275–295)
NON-UH HIE CHLORIDE: 108 MMOL/L (ref 98–107)
NON-UH HIE CO2, VENOUS: 25 MMOL/L (ref 20–31)
NON-UH HIE COLOR, U: NORMAL
NON-UH HIE CREATININE: 0.8 MG/DL (ref 0.6–1.1)
NON-UH HIE GFR AA: >60
NON-UH HIE GLOMERULAR FILTRATION RATE: >60 ML/MIN/1.73M?
NON-UH HIE GLUCOSE QUAL, U: NORMAL
NON-UH HIE GLUCOSE: 100 MG/DL (ref 74–106)
NON-UH HIE HCT: 37.7 % (ref 41–52)
NON-UH HIE HGB: 12.8 G/DL (ref 13.5–17.5)
NON-UH HIE INR: 1
NON-UH HIE INSTR WBC ND: 7.1
NON-UH HIE K: 4.2 MMOL/L (ref 3.5–5.1)
NON-UH HIE KETONES, U: NORMAL
NON-UH HIE LEUKOCYTE ESTERASE, U: NORMAL
NON-UH HIE MCH: 33.6 PG (ref 27–34)
NON-UH HIE MCHC: 34 G/DL (ref 32–37)
NON-UH HIE MCV: 98.8 FL (ref 80–100)
NON-UH HIE MPV: 7.9 FL (ref 7.4–10.4)
NON-UH HIE NA: 145 MMOL/L (ref 135–145)
NON-UH HIE NITRITE, U: NORMAL
NON-UH HIE PH, U: 5.5 (ref 4.5–8)
NON-UH HIE PLATELET: 321 X10 (ref 150–450)
NON-UH HIE PROTEIN, U: NORMAL
NON-UH HIE PROTIME PATIENT: 10.9 SECONDS (ref 9.8–12.4)
NON-UH HIE RBC: 3.81 X10 (ref 4.7–6.1)
NON-UH HIE RDW: 13.7 % (ref 11.5–14.5)
NON-UH HIE SPECIFIC GRAVITY, U: 1.01 (ref 1–1.03)
NON-UH HIE U MICRO: NORMAL
NON-UH HIE UROBILINOGEN QUAL, U: NORMAL
NON-UH HIE WBC: 7.1 X10 (ref 4.5–11)

## 2025-06-05 DIAGNOSIS — I10 ESSENTIAL (PRIMARY) HYPERTENSION: ICD-10-CM

## 2025-06-05 RX ORDER — LOSARTAN POTASSIUM 100 MG/1
100 TABLET ORAL DAILY
Qty: 90 TABLET | Refills: 0 | Status: SHIPPED | OUTPATIENT
Start: 2025-06-05

## 2025-06-06 ENCOUNTER — TELEPHONE (OUTPATIENT)
Dept: PRIMARY CARE | Facility: CLINIC | Age: 85
End: 2025-06-06
Payer: MEDICARE

## 2025-06-06 NOTE — TELEPHONE ENCOUNTER
If they are red and painful he needs to go to the emergency room for evaluation.  Otherwise schedule appointment for next week

## 2025-06-06 NOTE — TELEPHONE ENCOUNTER
Patient has swelling in both legs and is concerned, he has been wearing compression socks. His wife believes it is because he is inactive but he is concerned about blood clots. They want to know what he should do. Please advise.

## 2025-06-11 ENCOUNTER — APPOINTMENT (OUTPATIENT)
Dept: PRIMARY CARE | Facility: CLINIC | Age: 85
End: 2025-06-11
Payer: MEDICARE

## 2025-06-11 VITALS
BODY MASS INDEX: 25.39 KG/M2 | HEART RATE: 57 BPM | HEIGHT: 69 IN | TEMPERATURE: 97.2 F | OXYGEN SATURATION: 99 % | WEIGHT: 171.4 LBS | DIASTOLIC BLOOD PRESSURE: 60 MMHG | SYSTOLIC BLOOD PRESSURE: 160 MMHG

## 2025-06-11 DIAGNOSIS — I10 BENIGN ESSENTIAL HYPERTENSION: Primary | ICD-10-CM

## 2025-06-11 DIAGNOSIS — R60.9 EDEMA, UNSPECIFIED TYPE: ICD-10-CM

## 2025-06-11 PROCEDURE — 3077F SYST BP >= 140 MM HG: CPT | Performed by: FAMILY MEDICINE

## 2025-06-11 PROCEDURE — 1158F ADVNC CARE PLAN TLK DOCD: CPT | Performed by: FAMILY MEDICINE

## 2025-06-11 PROCEDURE — 1036F TOBACCO NON-USER: CPT | Performed by: FAMILY MEDICINE

## 2025-06-11 PROCEDURE — 1159F MED LIST DOCD IN RCRD: CPT | Performed by: FAMILY MEDICINE

## 2025-06-11 PROCEDURE — G2211 COMPLEX E/M VISIT ADD ON: HCPCS | Performed by: FAMILY MEDICINE

## 2025-06-11 PROCEDURE — 1160F RVW MEDS BY RX/DR IN RCRD: CPT | Performed by: FAMILY MEDICINE

## 2025-06-11 PROCEDURE — 99214 OFFICE O/P EST MOD 30 MIN: CPT | Performed by: FAMILY MEDICINE

## 2025-06-11 PROCEDURE — 3078F DIAST BP <80 MM HG: CPT | Performed by: FAMILY MEDICINE

## 2025-06-11 RX ORDER — CHLORTHALIDONE 25 MG/1
12.5 TABLET ORAL DAILY
Qty: 45 TABLET | Refills: 0 | Status: SHIPPED | OUTPATIENT
Start: 2025-06-11

## 2025-06-11 ASSESSMENT — PATIENT HEALTH QUESTIONNAIRE - PHQ9
SUM OF ALL RESPONSES TO PHQ9 QUESTIONS 1 & 2: 0
1. LITTLE INTEREST OR PLEASURE IN DOING THINGS: NOT AT ALL
2. FEELING DOWN, DEPRESSED OR HOPELESS: NOT AT ALL

## 2025-06-11 NOTE — PROGRESS NOTES
"Subjective   Patient ID: Modesto Velarde is a 84 y.o. male.    HPI  84-year-old male history of hypertension hypothyroidism on amlodipine losartan levothyroxine is status post right total shoulder arthroplasty 1 month ago complains of bilateral lower extremity swelling without redness fevers chills sweats ulcerations chest pain or dyspnea.  He had been sleeping in a chair since his operation.  Ambulation during recovery is decreased.  Was formally using compression stockings which he stopped using.  He was also on chlorthalidone which was was not refilled  Review of Systems  He denies orthopnea chest pain dyspnea on exertion diaphoresis palpitations syncope fever cough chest congestion or wheeze  Objective   Physical Exam    /60   Pulse 57   Temp 36.2 °C (97.2 °F)   Ht 1.753 m (5' 9\")   Wt 77.7 kg (171 lb 6.4 oz)   SpO2 99%   BMI 25.31 kg/m²       Appears comfortable  No retractions  Skin without pallor petechia icterus cyanosis  Neck without JVD thyromegaly bruits  Chest clear to auscultation without rales rhonchi wheeze  Heart regular rate and rhythm without murmur  Abdomen soft nondistended nontender without organomegaly or mass  Extremities 1+ edema without erythema Homans tenderness or cord  Peripheral pulses palpable      Assessment/Plan   Diagnoses and all orders for this visit:  Benign essential hypertension  -     chlorthalidone (Hygroton) 25 mg tablet; Take 0.5 tablets (12.5 mg) by mouth once daily.  -     Basic metabolic panel; Future  Edema, unspecified type  -     Basic metabolic panel; Future  Anticipate improvement with the recent addition of chlorthalidone ambulation and no strength compression stockings    Follow-up in 2 to 3-month    Continue physical therapy      "

## 2025-06-17 DIAGNOSIS — I10 BENIGN ESSENTIAL HYPERTENSION: ICD-10-CM

## 2025-06-17 RX ORDER — AMLODIPINE BESYLATE 10 MG/1
10 TABLET ORAL DAILY
Qty: 90 TABLET | Refills: 1 | Status: SHIPPED | OUTPATIENT
Start: 2025-06-17

## 2025-07-24 LAB
ANION GAP SERPL CALCULATED.4IONS-SCNC: 10 MMOL/L (CALC) (ref 7–17)
BUN SERPL-MCNC: 13 MG/DL (ref 7–25)
BUN/CREAT SERPL: NORMAL (CALC) (ref 6–22)
CALCIUM SERPL-MCNC: 9.2 MG/DL (ref 8.6–10.3)
CHLORIDE SERPL-SCNC: 100 MMOL/L (ref 98–110)
CO2 SERPL-SCNC: 27 MMOL/L (ref 20–32)
CREAT SERPL-MCNC: 0.82 MG/DL (ref 0.7–1.22)
EGFRCR SERPLBLD CKD-EPI 2021: 87 ML/MIN/1.73M2
GLUCOSE SERPL-MCNC: 111 MG/DL (ref 65–139)
POTASSIUM SERPL-SCNC: 4 MMOL/L (ref 3.5–5.3)
SODIUM SERPL-SCNC: 137 MMOL/L (ref 135–146)

## 2025-08-05 ENCOUNTER — APPOINTMENT (OUTPATIENT)
Dept: PRIMARY CARE | Facility: CLINIC | Age: 85
End: 2025-08-05
Payer: MEDICARE

## 2025-08-05 VITALS
BODY MASS INDEX: 25.18 KG/M2 | HEIGHT: 69 IN | TEMPERATURE: 97.3 F | DIASTOLIC BLOOD PRESSURE: 63 MMHG | SYSTOLIC BLOOD PRESSURE: 135 MMHG | OXYGEN SATURATION: 98 % | WEIGHT: 170 LBS | HEART RATE: 56 BPM

## 2025-08-05 DIAGNOSIS — I10 BENIGN ESSENTIAL HYPERTENSION: Primary | ICD-10-CM

## 2025-08-05 PROCEDURE — 3075F SYST BP GE 130 - 139MM HG: CPT | Performed by: FAMILY MEDICINE

## 2025-08-05 PROCEDURE — 3078F DIAST BP <80 MM HG: CPT | Performed by: FAMILY MEDICINE

## 2025-08-05 PROCEDURE — 1159F MED LIST DOCD IN RCRD: CPT | Performed by: FAMILY MEDICINE

## 2025-08-05 PROCEDURE — 1160F RVW MEDS BY RX/DR IN RCRD: CPT | Performed by: FAMILY MEDICINE

## 2025-08-05 PROCEDURE — 99213 OFFICE O/P EST LOW 20 MIN: CPT | Performed by: FAMILY MEDICINE

## 2025-08-05 PROCEDURE — G2211 COMPLEX E/M VISIT ADD ON: HCPCS | Performed by: FAMILY MEDICINE

## 2025-08-05 NOTE — PROGRESS NOTES
"Subjective   Patient ID: Modesto Velarde is a 84 y.o. male who presents for Hypertension.  HPI  Here for follow-up on hypertension    Review of Systems     denies chest pain SOB CONWAY diaphoresis nausea palpitations syncope presyncope orthopnea increased edema leg pain dysarthria aphasia focal weakness headache numbness tingling  visual disturbance fatigue weight change temperature intolerance.        Objective   Physical Exam    /63   Pulse 56   Temp 36.3 °C (97.3 °F)   Ht 1.753 m (5' 9\")   Wt 77.1 kg (170 lb)   SpO2 98%   BMI 25.10 kg/m²       Appears comfortable  No retractions  Skin without pallor petechia icterus cyanosis  Neck without JVD thyromegaly bruits  Chest clear to auscultation without rales rhonchi wheeze  Heart regular rate and rhythm without murmur  Extremities without erythema edema Homans or cord  Peripheral pulses palpable  Neuro intact      7/23/2025 BMP reviewed results normal    Assessment/Plan   Problem List Items Addressed This Visit           ICD-10-CM    Benign essential hypertension - Primary I10     Continue losartan amlodipine and chlorthalidone    Follow-up as scheduled       Modesto Feliz MD 08/05/25 11:58 AM   "

## 2025-08-30 DIAGNOSIS — E78.00 HYPERCHOLESTEROLEMIA: ICD-10-CM

## 2025-09-01 RX ORDER — ATORVASTATIN CALCIUM 20 MG/1
20 TABLET, FILM COATED ORAL DAILY
Qty: 90 TABLET | Refills: 3 | Status: SHIPPED | OUTPATIENT
Start: 2025-09-01

## 2025-09-05 DIAGNOSIS — I10 ESSENTIAL (PRIMARY) HYPERTENSION: ICD-10-CM

## 2025-09-05 RX ORDER — LOSARTAN POTASSIUM 100 MG/1
100 TABLET ORAL DAILY
Qty: 90 TABLET | Refills: 0 | Status: SHIPPED | OUTPATIENT
Start: 2025-09-05

## 2025-10-08 ENCOUNTER — APPOINTMENT (OUTPATIENT)
Dept: PRIMARY CARE | Facility: CLINIC | Age: 85
End: 2025-10-08
Payer: MEDICARE